# Patient Record
Sex: MALE | Race: WHITE | Employment: FULL TIME | URBAN - METROPOLITAN AREA
[De-identification: names, ages, dates, MRNs, and addresses within clinical notes are randomized per-mention and may not be internally consistent; named-entity substitution may affect disease eponyms.]

---

## 2018-04-20 ENCOUNTER — TELEPHONE (OUTPATIENT)
Dept: ORTHOPEDIC SURGERY | Age: 48
End: 2018-04-20

## 2018-04-20 DIAGNOSIS — M75.42 IMPINGEMENT SYNDROME, SHOULDER, LEFT: Primary | ICD-10-CM

## 2018-04-23 ENCOUNTER — OFFICE VISIT (OUTPATIENT)
Dept: ORTHOPEDIC SURGERY | Age: 48
End: 2018-04-23
Payer: COMMERCIAL

## 2018-04-23 VITALS
DIASTOLIC BLOOD PRESSURE: 83 MMHG | HEIGHT: 75 IN | TEMPERATURE: 98.2 F | WEIGHT: 248 LBS | SYSTOLIC BLOOD PRESSURE: 137 MMHG | BODY MASS INDEX: 30.84 KG/M2 | RESPIRATION RATE: 18 BRPM | HEART RATE: 63 BPM

## 2018-04-23 DIAGNOSIS — M75.42 IMPINGEMENT SYNDROME, SHOULDER, LEFT: Primary | ICD-10-CM

## 2018-04-23 PROCEDURE — 3017F COLORECTAL CA SCREEN DOC REV: CPT | Performed by: ORTHOPAEDIC SURGERY

## 2018-04-23 PROCEDURE — 20610 DRAIN/INJ JOINT/BURSA W/O US: CPT | Performed by: ORTHOPAEDIC SURGERY

## 2018-04-23 PROCEDURE — 99213 OFFICE O/P EST LOW 20 MIN: CPT | Performed by: ORTHOPAEDIC SURGERY

## 2018-04-23 PROCEDURE — 4004F PT TOBACCO SCREEN RCVD TLK: CPT | Performed by: ORTHOPAEDIC SURGERY

## 2018-04-23 PROCEDURE — G8427 DOCREV CUR MEDS BY ELIG CLIN: HCPCS | Performed by: ORTHOPAEDIC SURGERY

## 2018-04-23 PROCEDURE — G8417 CALC BMI ABV UP PARAM F/U: HCPCS | Performed by: ORTHOPAEDIC SURGERY

## 2018-04-23 RX ORDER — TRIAMCINOLONE ACETONIDE 40 MG/ML
80 INJECTION, SUSPENSION INTRA-ARTICULAR; INTRAMUSCULAR ONCE
Status: COMPLETED | OUTPATIENT
Start: 2018-04-23 | End: 2018-04-23

## 2018-04-23 RX ADMIN — TRIAMCINOLONE ACETONIDE 80 MG: 40 INJECTION, SUSPENSION INTRA-ARTICULAR; INTRAMUSCULAR at 11:01

## 2019-02-19 ENCOUNTER — OFFICE VISIT (OUTPATIENT)
Dept: ORTHOPEDIC SURGERY | Age: 49
End: 2019-02-19
Payer: COMMERCIAL

## 2019-02-19 VITALS
RESPIRATION RATE: 18 BRPM | SYSTOLIC BLOOD PRESSURE: 140 MMHG | TEMPERATURE: 98.6 F | DIASTOLIC BLOOD PRESSURE: 80 MMHG | HEART RATE: 65 BPM

## 2019-02-19 DIAGNOSIS — M25.512 BILATERAL SHOULDER PAIN, UNSPECIFIED CHRONICITY: Primary | ICD-10-CM

## 2019-02-19 DIAGNOSIS — M75.42 IMPINGEMENT SYNDROME, SHOULDER, LEFT: ICD-10-CM

## 2019-02-19 DIAGNOSIS — R20.0 NUMBNESS AND TINGLING IN LEFT HAND: ICD-10-CM

## 2019-02-19 DIAGNOSIS — M25.511 BILATERAL SHOULDER PAIN, UNSPECIFIED CHRONICITY: Primary | ICD-10-CM

## 2019-02-19 DIAGNOSIS — R20.2 NUMBNESS AND TINGLING IN LEFT HAND: ICD-10-CM

## 2019-02-19 PROCEDURE — G8484 FLU IMMUNIZE NO ADMIN: HCPCS | Performed by: ORTHOPAEDIC SURGERY

## 2019-02-19 PROCEDURE — G8427 DOCREV CUR MEDS BY ELIG CLIN: HCPCS | Performed by: ORTHOPAEDIC SURGERY

## 2019-02-19 PROCEDURE — 99213 OFFICE O/P EST LOW 20 MIN: CPT | Performed by: ORTHOPAEDIC SURGERY

## 2019-02-19 PROCEDURE — G8417 CALC BMI ABV UP PARAM F/U: HCPCS | Performed by: ORTHOPAEDIC SURGERY

## 2019-02-19 PROCEDURE — 3017F COLORECTAL CA SCREEN DOC REV: CPT | Performed by: ORTHOPAEDIC SURGERY

## 2019-02-19 PROCEDURE — 20610 DRAIN/INJ JOINT/BURSA W/O US: CPT | Performed by: ORTHOPAEDIC SURGERY

## 2019-02-19 PROCEDURE — 4004F PT TOBACCO SCREEN RCVD TLK: CPT | Performed by: ORTHOPAEDIC SURGERY

## 2019-02-19 RX ORDER — TRIAMCINOLONE ACETONIDE 40 MG/ML
80 INJECTION, SUSPENSION INTRA-ARTICULAR; INTRAMUSCULAR ONCE
Status: COMPLETED | OUTPATIENT
Start: 2019-02-19 | End: 2019-02-19

## 2019-02-19 RX ADMIN — TRIAMCINOLONE ACETONIDE 80 MG: 40 INJECTION, SUSPENSION INTRA-ARTICULAR; INTRAMUSCULAR at 16:13

## 2019-03-01 ENCOUNTER — HOSPITAL ENCOUNTER (OUTPATIENT)
Dept: NEUROLOGY | Age: 49
Discharge: HOME | End: 2019-03-01
Payer: COMMERCIAL

## 2019-03-01 DIAGNOSIS — R20.2 NUMBNESS AND TINGLING IN LEFT HAND: ICD-10-CM

## 2019-03-01 DIAGNOSIS — R20.0 NUMBNESS AND TINGLING IN LEFT HAND: ICD-10-CM

## 2019-03-01 DIAGNOSIS — M25.511 BILATERAL SHOULDER PAIN, UNSPECIFIED CHRONICITY: ICD-10-CM

## 2019-03-01 DIAGNOSIS — M25.512 BILATERAL SHOULDER PAIN, UNSPECIFIED CHRONICITY: ICD-10-CM

## 2019-03-01 PROCEDURE — 95886 MUSC TEST DONE W/N TEST COMP: CPT | Performed by: PSYCHIATRY & NEUROLOGY

## 2019-03-01 PROCEDURE — 95912 NRV CNDJ TEST 11-12 STUDIES: CPT | Performed by: PSYCHIATRY & NEUROLOGY

## 2019-03-01 PROCEDURE — 95913 NRV CNDJ TEST 13/> STUDIES: CPT

## 2019-03-01 PROCEDURE — 95886 MUSC TEST DONE W/N TEST COMP: CPT

## 2019-10-06 ENCOUNTER — HOSPITAL ENCOUNTER (EMERGENCY)
Age: 49
Discharge: HOME | End: 2019-10-06
Attending: EMERGENCY MEDICINE
Payer: COMMERCIAL

## 2019-10-06 VITALS
WEIGHT: 258 LBS | OXYGEN SATURATION: 97 % | SYSTOLIC BLOOD PRESSURE: 159 MMHG | DIASTOLIC BLOOD PRESSURE: 80 MMHG | TEMPERATURE: 98.3 F | BODY MASS INDEX: 32.08 KG/M2 | HEART RATE: 98 BPM | RESPIRATION RATE: 14 BRPM | HEIGHT: 75 IN

## 2019-10-06 DIAGNOSIS — R00.2 PALPITATIONS: ICD-10-CM

## 2019-10-06 DIAGNOSIS — F41.1 ANXIETY STATE: Primary | ICD-10-CM

## 2019-10-06 PROCEDURE — 99284 EMERGENCY DEPT VISIT MOD MDM: CPT

## 2019-10-06 PROCEDURE — 93005 ELECTROCARDIOGRAM TRACING: CPT | Performed by: EMERGENCY MEDICINE

## 2019-10-08 LAB
EKG ATRIAL RATE: 88 BPM
EKG P AXIS: 35 DEGREES
EKG P-R INTERVAL: 146 MS
EKG Q-T INTERVAL: 370 MS
EKG QRS DURATION: 104 MS
EKG QTC CALCULATION (BAZETT): 447 MS
EKG R AXIS: 13 DEGREES
EKG T AXIS: 11 DEGREES
EKG VENTRICULAR RATE: 88 BPM

## 2019-10-08 PROCEDURE — 93010 ELECTROCARDIOGRAM REPORT: CPT | Performed by: INTERNAL MEDICINE

## 2020-05-26 ENCOUNTER — TELEPHONE (OUTPATIENT)
Dept: ORTHOPEDIC SURGERY | Age: 50
End: 2020-05-26

## 2020-06-18 ENCOUNTER — OFFICE VISIT (OUTPATIENT)
Dept: ORTHOPEDIC SURGERY | Age: 50
End: 2020-06-18
Payer: COMMERCIAL

## 2020-06-18 VITALS — BODY MASS INDEX: 30.46 KG/M2 | HEIGHT: 75 IN | TEMPERATURE: 97 F | WEIGHT: 245 LBS | RESPIRATION RATE: 18 BRPM

## 2020-06-18 PROCEDURE — 20610 DRAIN/INJ JOINT/BURSA W/O US: CPT | Performed by: ORTHOPAEDIC SURGERY

## 2020-06-18 PROCEDURE — 99213 OFFICE O/P EST LOW 20 MIN: CPT | Performed by: ORTHOPAEDIC SURGERY

## 2020-06-18 PROCEDURE — G8427 DOCREV CUR MEDS BY ELIG CLIN: HCPCS | Performed by: ORTHOPAEDIC SURGERY

## 2020-06-18 PROCEDURE — 4004F PT TOBACCO SCREEN RCVD TLK: CPT | Performed by: ORTHOPAEDIC SURGERY

## 2020-06-18 PROCEDURE — G8417 CALC BMI ABV UP PARAM F/U: HCPCS | Performed by: ORTHOPAEDIC SURGERY

## 2020-06-18 PROCEDURE — 3017F COLORECTAL CA SCREEN DOC REV: CPT | Performed by: ORTHOPAEDIC SURGERY

## 2020-06-18 RX ORDER — TRIAMCINOLONE ACETONIDE 40 MG/ML
80 INJECTION, SUSPENSION INTRA-ARTICULAR; INTRAMUSCULAR ONCE
Status: COMPLETED | OUTPATIENT
Start: 2020-06-18 | End: 2020-06-18

## 2020-06-18 RX ADMIN — TRIAMCINOLONE ACETONIDE 80 MG: 40 INJECTION, SUSPENSION INTRA-ARTICULAR; INTRAMUSCULAR at 10:23

## 2020-06-18 NOTE — PROGRESS NOTES
activity     Days per week: Not on file     Minutes per session: Not on file    Stress: Not on file   Relationships    Social connections     Talks on phone: Not on file     Gets together: Not on file     Attends Anabaptist service: Not on file     Active member of club or organization: Not on file     Attends meetings of clubs or organizations: Not on file     Relationship status: Not on file    Intimate partner violence     Fear of current or ex partner: Not on file     Emotionally abused: Not on file     Physically abused: Not on file     Forced sexual activity: Not on file   Other Topics Concern    Not on file   Social History Narrative    Not on file     Family History   Adopted: Yes       Skin: (-) rash,(-) psoriasis,(-) eczema, (-)skin cancer. Musculoskeletal: (-) fractures,  (-) dislocations,(-) collagen vascular disease, (-) fibromyalgia, (-) multiple sclerosis, (-) muscular dystrophy, (-) RSD,(-) joint pain (-)swelling, (-) joint pain,swelling. Neurologic: (-) epilepsy, (-)seizures,(-) brain tumor,(-) TIA, (-)stroke, (-)headaches, (-)Parkinson disease,(-) memory loss, (-) LOC. Cardiovascular: (-) Chest pain, (-) swelling in legs/feet, (-) SOB, (-) cramping in legs/feet with walking. SUBJECTIVE:      Constitutional:    The patient is alert and oriented x 3, appears to be stated age and in no distress. Right shoulder: Positive tenderness over the long head of the biceps. Positive tenderness over the anterolateral aspect the shoulder. Positive impingement maneuver. Forward elevation 170 degrees. Abduction 100 degrees. Negative drop arm test.  Negative Hornblower's. Nontender over the [facility] joint. Internal rotation of the lower lumbar spine. Negative crossarm test.  Negative belly press test.  Radial, ulnar, median and axillary nerve sensation intact. Motor testing 5/5 in deltoid, biceps, triceps, wrist flexors and digital extensors and flexors and intrinsics. 2+ radial pulse.     Xrays: Trays of the right shoulder AP scapular Y and axillary views were negative for acute fracture dislocations. Subacromial space is maintained. Impression office x-rays: Negative for fracture dislocations right shoulder  Radiographic findings reviewed with patient      Impression: Right shoulder impingement syndrome    Plan: Today's findings were explained. He wished to have a cortisone injection. Continue with range of motion exercises. May follow-up as needed. Procedure Note Cortisone Injection to Shoulder    The right shoulder was identified as the injection site. The risk and benefits of a cortisone injection were explained and the patient consented to the injection. Under sterile conditions, the shoulder SAS was injected with a mixture of 80mg of Kenelog and 4 mL of 5% Ropivacaine and 4 mL of 1% Lidocaine without complication. A sterile bandage was applied.

## 2020-08-10 ENCOUNTER — OFFICE VISIT (OUTPATIENT)
Dept: PHYSICAL MEDICINE AND REHAB | Age: 50
End: 2020-08-10
Payer: COMMERCIAL

## 2020-08-10 VITALS
HEIGHT: 75 IN | SYSTOLIC BLOOD PRESSURE: 132 MMHG | TEMPERATURE: 97.6 F | OXYGEN SATURATION: 98 % | HEART RATE: 76 BPM | DIASTOLIC BLOOD PRESSURE: 78 MMHG | WEIGHT: 244 LBS | BODY MASS INDEX: 30.34 KG/M2

## 2020-08-10 PROCEDURE — 99204 OFFICE O/P NEW MOD 45 MIN: CPT | Performed by: PHYSICAL MEDICINE & REHABILITATION

## 2020-08-10 PROCEDURE — G8417 CALC BMI ABV UP PARAM F/U: HCPCS | Performed by: PHYSICAL MEDICINE & REHABILITATION

## 2020-08-10 PROCEDURE — 4004F PT TOBACCO SCREEN RCVD TLK: CPT | Performed by: PHYSICAL MEDICINE & REHABILITATION

## 2020-08-10 PROCEDURE — G8427 DOCREV CUR MEDS BY ELIG CLIN: HCPCS | Performed by: PHYSICAL MEDICINE & REHABILITATION

## 2020-08-10 PROCEDURE — 3017F COLORECTAL CA SCREEN DOC REV: CPT | Performed by: PHYSICAL MEDICINE & REHABILITATION

## 2020-08-10 NOTE — PROGRESS NOTES
Jen Pastrana M.D. [address] [facility] PHYSICAL MEDICINE AND REHABILITAION  [address]  [place] [address]  Dept: [phone]  Dept Fax: [phone]    PCP: Mary Douglas  Date of visit: 8/10/20    Chief Complaint   Patient presents with    Back Pain     states L-3,4,5 is herniated. last MRI 10 yrs ago. States when back goes out pain radiates down legs has tried PT for back and knees 5-6 yrs ago       Jad Day is a 44 y.o. man with gradual onset of low back pain after no known injury many years ago. 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 Now, he reports constant mild pain, with periodic flare ups every few months. Flare ups typically last a few days. The pain is rated Pain Score:   2, but can reach 10/10 during a flare up. Pain is described as dull aching/burning/stiffness across the low back at or just above belt line level. Typically pain does not radiate, but when he has a flare up he will have radiation to either the right or left leg, to just above the ankle. He denies associated numbness/tingling. He denies weakness. No incointinence reported. The symptoms have been worse since onset. The pain is better with chiropractic care, ibuprofen, lumbar extension. He takes the ibuprofen only for a few days during flare ups. He states he does not like to take medications for pain and does not feel he needs any medication for pain in between flare ups. The pain is worse with lumbar flexion, worse with other activity, but could not pinpoint certain type of activity, \"its a crap shoot\". He reports that he works as an . The prior workup has included:  No recent workup. Last imaging 10 years ago he reports. The prior treatment has included:  PT: Attended PT 5-6 years ago with improvement in pain.  Has not continued home exercise program.   Chiropractic: Sees chiropractor patient is in no apparent distress. Body habitus is well developed. HEENT: No rhinorrhea, sneezing, yawning, or lacrimation. No scleral icterus or conjunctival injection. SKIN: No piloerection. No track marks. No rash. Normal turgor. No erythema or ecchymosis. Psychological: Mood and affect are appropriate. Hygiene is appropriate. Cardiovascular:  Heart is regular rate. There is no edema. Respiratory: Respirations are regular and unlabored. There is no cyanosis. Lymphatic: There is no cervical lymphadenopathy. Gastrointestinal: Soft abdomen, non-tender. Genitourinary: No costovertebral angle tenderness. MSK: Lumbar:  Thoracic kyphosis normal and lumbar lordosis mildly decreased. Pelvis level. No scoliosis. Seated and standing flexion tests negative. There is no step off deformity. No superficial or bony tenderness. Lumbar AROM is full with flexion, extension, and side bending, without pain today. There is poor flexibility of the hamstrings bilaterally. There is mild tenderness over the bilateral lumbar paraspinals and lumbosacral junction. No tenderness to palpation at bilateral SIJ, gluteal muscles, PSIS, ischial tuberosity, greater trochanter, ASIS, iliac crest.  No trigger points. There is mild bilateral lumbar paraspinal spasm. No edema, erythema, ecchymosis,  mass or deformity. Supine straight leg raise is negative bilaterally. TIMOTEO is negative bilaterally. ASIS compression test is negative. Hip: Full painless AROM bilateral hip. Neurologic: Awake, alert and oriented in three planes. Speech is fluent. No facial weakness. Hearing is intact for conversation. Pupils are equal and round. Extraocular muscles are intact. Strength:   R  L  Hip Flex  5  5  Knee Ext  5  5  Ankle dorsi  5  5  EHL   5 5  Ankle Plantar  5  5    Sensory:  Intact for light touch and pin prick in all  lower extremity dermatomes.      Reflexes:   R  L  Patellar  2 2   Ankle Jerk  2 2    No Babinski   No clonus or spasticity. Gait is normal      Impression:   Chronic low back pain with occasional radicular symptoms       Plan:   · Lumbar xrays  · PT for lumbar stabilization, strengthening, stretching/ROM, core strengthening, postural education, pain reduction techniques, education on home exercise/therapy program. Also advised patient on improving hamstring flexibility. · May continue ibuprofen PRN for flare ups as he feels this is effective. Patient does not feel he needs maintenance medication. · Will add topical compound cream that he may use during flare ups  · Discussed other treatment options including medications, modalities, injections that can be considered pending response to above. The patient was educated about the diagnosis, prognosis, indications, risks and benefits of treatment. An opportunity to ask questions was given to the patient and questions were answered. The patient agreed to proceed with the recommended treatment as described above. Follow up 6 weeks     Thank you for the consultation and for allowing me to participate in the care of this patient. Isiah Lai M.D.   Physical Medicine and Rehabilitation

## 2020-08-11 ENCOUNTER — HOSPITAL ENCOUNTER (OUTPATIENT)
Age: 50
Discharge: HOME | End: 2020-08-13
Payer: COMMERCIAL

## 2020-08-11 ENCOUNTER — HOSPITAL ENCOUNTER (OUTPATIENT)
Dept: GENERAL RADIOLOGY | Age: 50
Discharge: HOME | End: 2020-08-13
Payer: COMMERCIAL

## 2020-08-11 PROCEDURE — 72110 X-RAY EXAM L-2 SPINE 4/>VWS: CPT

## 2020-08-19 ENCOUNTER — TELEPHONE (OUTPATIENT)
Dept: PHYSICAL MEDICINE AND REHAB | Age: 50
End: 2020-08-19

## 2020-08-19 NOTE — TELEPHONE ENCOUNTER
Patient called stating he had his X-rays done on 8/11 and was wondering about the results, was wanting someone to call him  Please advise      . Collette Ruts Electronically signed by Som Alvarenga MA on 8/19/2020 at 2:54 PM

## 2020-10-12 ENCOUNTER — OFFICE VISIT (OUTPATIENT)
Dept: PHYSICAL MEDICINE AND REHAB | Age: 50
End: 2020-10-12
Payer: COMMERCIAL

## 2020-10-12 VITALS
DIASTOLIC BLOOD PRESSURE: 84 MMHG | SYSTOLIC BLOOD PRESSURE: 122 MMHG | TEMPERATURE: 96.9 F | WEIGHT: 245 LBS | HEIGHT: 75 IN | BODY MASS INDEX: 30.46 KG/M2

## 2020-10-12 PROCEDURE — G8427 DOCREV CUR MEDS BY ELIG CLIN: HCPCS | Performed by: PHYSICAL MEDICINE & REHABILITATION

## 2020-10-12 PROCEDURE — 4004F PT TOBACCO SCREEN RCVD TLK: CPT | Performed by: PHYSICAL MEDICINE & REHABILITATION

## 2020-10-12 PROCEDURE — 3017F COLORECTAL CA SCREEN DOC REV: CPT | Performed by: PHYSICAL MEDICINE & REHABILITATION

## 2020-10-12 PROCEDURE — 99214 OFFICE O/P EST MOD 30 MIN: CPT | Performed by: PHYSICAL MEDICINE & REHABILITATION

## 2020-10-12 PROCEDURE — G8484 FLU IMMUNIZE NO ADMIN: HCPCS | Performed by: PHYSICAL MEDICINE & REHABILITATION

## 2020-10-12 PROCEDURE — G8417 CALC BMI ABV UP PARAM F/U: HCPCS | Performed by: PHYSICAL MEDICINE & REHABILITATION

## 2020-10-12 NOTE — PROGRESS NOTES
Lilibeth Middleton M.D. [address] [facility] PHYSICAL MEDICINE AND REHABILITAION  [address]  [place] [address]  Dept: [phone]  Dept Fax: [phone]    PCP: Jose Denton  Date of visit: 10/12/20    Chief Complaint   Patient presents with    Back Pain     f/u for back pain, went to PT for 2-3 sessions now doing exercises at home. states compound cream helped at first now hes not sure how much it is helping after they took B12 out       Cesar Madrigal is a 44 y.o. man who presents for follow up of low back pain. He reported gradual onset of low back pain after no known injury many years ago. 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 Since last visit he started PT and reports that he is now doing the exercises on his own at home with benefit. He reported improvement with compound cream initially, but states he asked them to take the B12 out because it was staining his clothes and now he is not sure if it is helping after they changed the formula. He completed lumbar xray with result as below. Now, he reports constant mild pain, with periodic flare ups every few months. Flare ups typically last a few days. The pain is rated Pain Score:   2 . Pain is described as dull aching/stiffness across the low back at or just above belt line level. Typically pain does not radiate, but when he has a flare up he may have radiation to either the right or left leg, to just above the ankle. He denies associated numbness/tingling. He denies weakness. No incointinence reported. The symptoms are somewhat improved since last visit. The pain is better with chiropractic care, ibuprofen, lumbar extension, home exercises. He takes the ibuprofen only for a few days during flare ups. He states he does not like to take medications for pain and does not feel he needs any medication for pain in between flare ups.  The pain is worse with lumbar flexion, worse with other activity. He reports that he works as an . The prior workup has included:  Lumbar xay 8/11/2020  Findings:    Alignment of the vertebral bodies appears to be near-anatomic    No fracture or foreign body is identified. The disc spaces demonstrate multilevel moderate degenerative    narrowing. There are multilevel endplate spurs present. There is no significant loss of the vertebral body height    There are mild to moderate degenerative changes involving the facets. Flexion-extension views are not included.              Impression         Findings consistent with moderate multilevel degenerative disc disease    and degenerative facets disease.               The prior treatment has included:  PT: Attended PT summer 2020 with benefit. Continues home exercise program.   Chiropractic: Sees chiropractor periodically with improvement in pain. Uses decompression table at chiropractor with improvement. Modalities: Uses inversion table at home with temporary relief. Uses TENs with improvement, has home TENs. Ice and heat both provide minimal relief. Using lumbar support brace when he is doing heavier activity with benefit. Topical: Lidoderm without relief. CBD oil without relief. Compound cream initially with relief, but not helping as much after they took B12 out  OTC Tylenol: None  NSAIDS: ibuprofen with relief, takes only during a flare up  Opioids: None  Membrane stabilizers: None  Muscle relaxers: None  Previous injections: None  Previous surgery at this site: None        No past medical history on file. Past Surgical History:   Procedure Laterality Date    KNEE ARTHROSCOPY Right 2013    KNEE ARTHROSCOPY Left 09/04/2015    Left knee scope. Lana Finch.  Clint Pike MD       Social History     Socioeconomic History    Marital status:      Spouse name: Not on file    Number of children: Not on file    Years of education: Not on file    Highest education level: Not on file   Occupational History    Not on file   Social Needs    Financial resource strain: Not on file    Food insecurity     Worry: Not on file     Inability: Not on file    Transportation needs     Medical: Not on file     Non-medical: Not on file   Tobacco Use    Smoking status: Never Smoker    Smokeless tobacco: Current User     Types: Snuff   Substance and Sexual Activity    Alcohol use: Yes     Alcohol/week: 2.0 - 3.0 standard drinks     Types: 2 - 3 Glasses of wine per week    Drug use: No    Sexual activity: Yes   Lifestyle    Physical activity     Days per week: Not on file     Minutes per session: Not on file    Stress: Not on file   Relationships    Social connections     Talks on phone: Not on file     Gets together: Not on file     Attends Episcopalian service: Not on file     Active member of club or organization: Not on file     Attends meetings of clubs or organizations: Not on file     Relationship status: Not on file    Intimate partner violence     Fear of current or ex partner: Not on file     Emotionally abused: Not on file     Physically abused: Not on file     Forced sexual activity: Not on file   Other Topics Concern    Not on file   Social History Narrative    Not on file       Family History   Adopted: Yes       Allergies   Allergen Reactions    Bee Venom Anaphylaxis       Current Outpatient Medications   Medication Sig Dispense Refill    COLLAGEN PO Take 1,600 mg by mouth      Multiple Vitamins-Minerals (MULTI COMPLETE PO) Take 1 tablet by mouth daily      melatonin 3 MG TABS tablet Take 3 mg by mouth nightly      ibuprofen (ADVIL;MOTRIN) 200 MG tablet Take 200 mg by mouth every 6 hours as needed for Pain       No current facility-administered medications for this visit. Review of Systems  General: No reported chills, fatigue, fever, malaise, night sweats, weight gain,  weight loss.    Psychological: No reported anxiety, depression, suicidal ideation   Ophthalmic: No reported blurry vision, decreased vision, double vision, loss of vision  Ear Nose Throat: No reported hearing loss, tinnitus, phonophobia, sensitivity to smells, vertigo, or vocal changes. Allergy/Immunology: No reported watery eyes, itchy eyes, frequent infections. Hematological and Lymphatic: No reported bleeding problems, blood clots, bruising  Endocrine:  No reported polydypsia, polyuria, temperature intolerance. Respiratory: No reported cough, shortness of breath, wheezing. Cardiovascular: No reported syncope, chest pain, dyspnea on exertion,palpitations. Gastrointestinal: No reported abdominal pain, hematemesis, melena, nausea, vomiting, stool incontinence  Genito-Urinary: No reported dysuria, hematuria, incontinence   Musculoskeletal: Per HPI  Neurological: Per HPI  Dermatological:  No reported rash      Physical Exam:   Blood pressure 122/84, temperature 96.9 °F (36.1 °C), height 6' 3\" (1.905 m), weight 245 lb (111.1 kg). General: The patient is in no apparent distress. Body habitus is well developed. HEENT: No rhinorrhea, sneezing, yawning, or lacrimation. No scleral icterus or conjunctival injection. SKIN: No piloerection. No track marks. No rash. Normal turgor. No erythema or ecchymosis. Psychological: Mood and affect are appropriate. Hygiene is appropriate. Cardiovascular:  Heart is regular rate. There is no edema. Respiratory: Respirations are regular and unlabored. There is no cyanosis. Lymphatic: There is no cervical lymphadenopathy. Gastrointestinal: Soft abdomen, non-tender. Genitourinary: No costovertebral angle tenderness. MSK: Lumbar:  Thoracic kyphosis normal and lumbar lordosis mildly decreased. Pelvis level. No scoliosis. Seated and standing flexion tests negative. There is no step off deformity. No superficial or bony tenderness. Lumbar AROM is full with flexion, extension, and side bending, without pain today.  There is poor flexibility of the hamstrings bilaterally--though improved compared to his last visit. There is mild tenderness over the bilateral lumbar paraspinals and lumbosacral junction. No tenderness to palpation at bilateral SIJ, gluteal muscles, PSIS, ischial tuberosity, greater trochanter, ASIS, iliac crest.  No trigger points. There is mild bilateral lumbar paraspinal spasm. No edema, erythema, ecchymosis,  mass or deformity. Straight leg raise is negative bilaterally. TIMOTEO is negative bilaterally. ASIS compression test is negative. Hip: Full painless AROM bilateral hip. Neurologic: Awake, alert and oriented in three planes. Speech is fluent. No facial weakness. Hearing is intact for conversation. Pupils are equal and round. Extraocular muscles are intact. Strength:   R  L  Hip Flex  5  5  Knee Ext  5  5  Ankle dorsi  5  5  EHL   5 5  Ankle Plantar  5  5    Sensory:  Intact for LT and PP in all  lower extremity dermatomes. Reflexes:   R  L  Patellar  2 2   Ankle Jerk  2 2    No Babinski   No clonus or spasticity. Gait is normal      Impression:   Lumbar DDD  Lumbar facet arthropathy      Plan:   · Continue home exercise program for maintenance. Reviewed hamstring stretches as well. · Will trial topical voltaren gel  · May continue ibuprofen PRN for flare ups as he feels this is effective and he is using rarely. Patient does not feel he needs maintenance medication. · May utilize lumbar support brace during heavier activity  · Discussed other treatment options including medications, modalities, injections that can be considered pending response to above. He feels that he is managing reasonably well at this time. The patient was educated about the diagnosis, prognosis, indications, risks and benefits of treatment. An opportunity to ask questions was given to the patient and questions were answered. The patient agreed to proceed with the recommended treatment as described above.    Follow up 3 months. May cancel and follow up PRN if he feels he is doing well and pain continues to be reasonably well controlled. Alexander Curling, M.D.   Physical Medicine and Rehabilitation

## 2022-05-27 ENCOUNTER — TELEPHONE (OUTPATIENT)
Dept: ORTHOPEDIC SURGERY | Age: 52
End: 2022-05-27

## 2022-05-27 NOTE — TELEPHONE ENCOUNTER
5/27/2022 2:55 pm Patient phoned the office to request an appointment next Tuesday w/ [facility]. New injury to left knee, stepped off a curb last Thursday, May 19 th. Saw PCP, X-rays done at [facility]. PCP feels it is a torn meniscus. Patient is requesting steroid pills. Patient reports if he cannot see [facility] on Tuesday, he has an appointment w/ the PA for a steroid injection. Informed patient: Unfortunately no appointments available next week. Patient requested an appointment for the 22 cd or 23 rd of June d/t he is RTW. Appointment made for Wednesday, June 22 cd at 9:30 am. Patient instructed to arrive 15 minutes before scheduled appointment, bring ID and insurance cards, bring copy of x-rays (disc) from [facility]. Patient expresses understanding and is in agreement w/ the plan.

## 2022-06-21 ENCOUNTER — OFFICE VISIT (OUTPATIENT)
Dept: ORTHOPEDIC SURGERY | Age: 52
End: 2022-06-21
Payer: COMMERCIAL

## 2022-06-21 VITALS — WEIGHT: 248 LBS | BODY MASS INDEX: 30.84 KG/M2 | HEIGHT: 75 IN

## 2022-06-21 DIAGNOSIS — T14.90XA INJURY: ICD-10-CM

## 2022-06-21 DIAGNOSIS — M17.12 PRIMARY OSTEOARTHRITIS OF LEFT KNEE: Primary | ICD-10-CM

## 2022-06-21 PROCEDURE — 20610 DRAIN/INJ JOINT/BURSA W/O US: CPT | Performed by: ORTHOPAEDIC SURGERY

## 2022-06-21 PROCEDURE — G8427 DOCREV CUR MEDS BY ELIG CLIN: HCPCS | Performed by: ORTHOPAEDIC SURGERY

## 2022-06-21 PROCEDURE — 99213 OFFICE O/P EST LOW 20 MIN: CPT | Performed by: ORTHOPAEDIC SURGERY

## 2022-06-21 PROCEDURE — G8417 CALC BMI ABV UP PARAM F/U: HCPCS | Performed by: ORTHOPAEDIC SURGERY

## 2022-06-21 PROCEDURE — 3017F COLORECTAL CA SCREEN DOC REV: CPT | Performed by: ORTHOPAEDIC SURGERY

## 2022-06-21 PROCEDURE — 4004F PT TOBACCO SCREEN RCVD TLK: CPT | Performed by: ORTHOPAEDIC SURGERY

## 2022-06-21 RX ORDER — KETOCONAZOLE 20 MG/ML
SHAMPOO TOPICAL
COMMUNITY

## 2022-06-21 RX ORDER — BUPIVACAINE HYDROCHLORIDE 2.5 MG/ML
3 INJECTION, SOLUTION INFILTRATION; PERINEURAL ONCE
Status: COMPLETED | OUTPATIENT
Start: 2022-06-21 | End: 2022-06-21

## 2022-06-21 RX ORDER — TRIAMCINOLONE ACETONIDE 40 MG/ML
80 INJECTION, SUSPENSION INTRA-ARTICULAR; INTRAMUSCULAR ONCE
Status: COMPLETED | OUTPATIENT
Start: 2022-06-21 | End: 2022-06-21

## 2022-06-21 RX ORDER — DICLOFENAC SODIUM 20 MG/G
SOLUTION TOPICAL 2 TIMES DAILY PRN
Status: ON HOLD | COMMUNITY
End: 2022-08-08 | Stop reason: HOSPADM

## 2022-06-21 RX ADMIN — BUPIVACAINE HYDROCHLORIDE 7.5 MG: 2.5 INJECTION, SOLUTION INFILTRATION; PERINEURAL at 12:01

## 2022-06-21 RX ADMIN — TRIAMCINOLONE ACETONIDE 80 MG: 40 INJECTION, SUSPENSION INTRA-ARTICULAR; INTRAMUSCULAR at 12:02

## 2022-06-21 NOTE — PROGRESS NOTES
Chief Complaint:   Chief Complaint   Patient presents with    Knee Injury     Left knee injury 5/19/2022. Was in [place], stepped off curb, over-extended left knee. Knee has been locking up. Dropped off x-ray disc last week. Kirstin Velasco presents with recent left knee pain after a nonspecific ground-level step and twist a few weeks ago. He is having mechanical symptoms of subjective locking or giving way with transfer activities. History is notable for arthroscopy of both knees in the remote past at which time significant patellofemoral arthritis was seen on the left. He is taking some over-the-counter's on occasion with partial relief. Right knee at this time relatively symptomatic no other joint complaints. Allergies; medications; past medical, surgical, family, and social history; and problem list have been reviewed today and updated as indicated in this encounter seen below. Exam: Leg lengths equal hip motion painless, both knees normally aligned straight and stable to stress through full range of motion with moderate patellar crepitus left more than right knee. No effusion or erythema. Radiographs: Weightbearing AP x-ray of the knees today do show some joint space narrowing and irregularities not end-stage, previous x-rays done at [facility] recently were reviewed by me today and on the lateral view there is complete loss of retropatellar joint space with patellar sclerosis consistent with end-stage patellofemoral arthritis left knee. Gabriela Eubanks was seen today for knee injury.     Diagnoses and all orders for this visit:    Primary osteoarthritis of left knee  -     NM ARTHROCENTESIS ASPIR&/INJ MAJOR JT/BURSA W/O US    Injury  -     XR KNEE BILATERAL STANDING  -     Cancel: XR KNEE LEFT (1-2 VIEWS)    Other orders  -     triamcinolone acetonide (KENALOG-40) injection 80 mg  -     bupivacaine (MARCAINE) 0.25 % injection 7.5 mg       Diagnosis and treatment options were reviewed with the patient, he is thinking about another arthroscopic \"cleanout\" although at this point I think his disease has advanced to the point where that may not likely benefit him and I advised him of that today. We discussed other alternatives including injection which she requested today, I injected left knee with Kenalog and Marcaine no difficulty or complication tolerated well. Low-impact exercise also advised and follow-up in 6 weeks if pain persist for further discussion about arthroscopy with tempered expectation versus total knee arthroplasty. Return in about 6 weeks (around 8/2/2022). Current Outpatient Medications   Medication Sig Dispense Refill    ketoconazole (NIZORAL) 2 % shampoo ketoconazole 2 % shampoo   APPLY TO THE AFFECTED AREA DAILY/AS NEEDED. ADVISED TO LET SIT 15 MINUTES PRIOR TO WASHING OUT.      Diclofenac Sodium (PENNSAID) 2 % SOLN Apply topically 2 times daily as needed      Multiple Vitamins-Minerals (MULTI COMPLETE PO) Take 1 tablet by mouth daily      melatonin 3 MG TABS tablet Take 3 mg by mouth nightly      ibuprofen (ADVIL;MOTRIN) 200 MG tablet Take 200 mg by mouth every 6 hours as needed for Pain      diclofenac sodium (VOLTAREN) 1 % GEL Apply 4 g topically 4 times daily as needed (pain) 480 g 4    COLLAGEN PO Take 1,600 mg by mouth (Patient not taking: Reported on 6/21/2022)       No current facility-administered medications for this visit. Patient Active Problem List   Diagnosis    Bilateral shoulder pain       History reviewed. No pertinent past medical history. Past Surgical History:   Procedure Laterality Date    KNEE ARTHROSCOPY Right 2013    KNEE ARTHROSCOPY Left 09/04/2015    Left knee scope. Ashley Reynoso.  Francis Alaniz MD       Allergies   Allergen Reactions    Bee Venom Anaphylaxis       Social History     Socioeconomic History    Marital status:      Spouse name: None    Number of children: None    Years of education: None    Highest education level: None   Occupational History    None   Tobacco Use    Smoking status: Never Smoker    Smokeless tobacco: Current User     Types: Snuff   Substance and Sexual Activity    Alcohol use: Yes     Alcohol/week: 2.0 - 3.0 standard drinks     Types: 2 - 3 Glasses of wine per week    Drug use: No    Sexual activity: Yes   Other Topics Concern    None   Social History Narrative    None     Social Determinants of Health     Financial Resource Strain:     Difficulty of Paying Living Expenses: Not on file   Food Insecurity:     Worried About Running Out of Food in the Last Year: Not on file    Marya of Food in the Last Year: Not on file   Transportation Needs:     Lack of Transportation (Medical): Not on file    Lack of Transportation (Non-Medical): Not on file   Physical Activity:     Days of Exercise per Week: Not on file    Minutes of Exercise per Session: Not on file   Stress:     Feeling of Stress : Not on file   Social Connections:     Frequency of Communication with Friends and Family: Not on file    Frequency of Social Gatherings with Friends and Family: Not on file    Attends Hindu Services: Not on file    Active Member of [address] [place] [address] or Organizations: Not on file    Attends Club or Organization Meetings: Not on file    Marital Status: Not on file   Intimate Partner Violence:     Fear of Current or Ex-Partner: Not on file    Emotionally Abused: Not on file    Physically Abused: Not on file    Sexually Abused: Not on file   Housing Stability:     Unable to Pay for Housing in the Last Year: Not on file    Number of [place] in the Last Year: Not on file    Unstable Housing in the Last Year: Not on file       Family History   Adopted: Yes         Review of Systems  As follows except as previously noted in HPI:  Constitutional: Negative for chills, diaphoresis, fatigue, fever and unexpected weight change. Respiratory: Negative for cough, shortness of breath and wheezing.     Cardiovascular: Negative for chest pain and palpitations. Neurological: Negative for dizziness, syncope, cephalgia. GI / : negative  Musculoskeletal: see HPI       Objective:   Physical Exam   Constitutional: Oriented to person, place, and time. and appears well-developed and well-nourished. :   Head: Normocephalic and atraumatic. Eyes: EOM are normal.   Neck: Neck supple. Cardiovascular: Normal rate and regular rhythm. Pulmonary/Chest: Effort normal. No stridor. No respiratory distress, no wheezes. Abdominal:  No abnormal distension. Neurological: Alert and oriented to person, place, and time. Skin: Skin is warm and dry. Psychiatric: Normal mood and affect.  Behavior is normal. Thought content normal.    6/21/2022  12:05 PM

## 2022-07-11 ENCOUNTER — TELEPHONE (OUTPATIENT)
Dept: ORTHOPEDIC SURGERY | Age: 52
End: 2022-07-11

## 2022-07-11 NOTE — TELEPHONE ENCOUNTER
Patient phoned the office today stating he would like to proceed with left knee arthroscopic surgery. Steroid injection received last office visit  6/21/22 only helped for approximately a week. Knee is tender to touch, stiff in the morning, and  has a knot behind knee. If available he is hoping to get on your surgery schedule August 8th or 15th. Please advise.

## 2022-07-13 NOTE — TELEPHONE ENCOUNTER
7/12/2022 Follow up phone call / General Abide w/ and informed patient: Will schedule surgery on 8/08 as requested. Patient given PAT's phone # [phone] (reason: patient may still be in [place] when nurse calls w/ pre op instructions)    Surgical Procedure: LEFT KNEE ARTHROSCOPY, Anesthesia: [facility], Date: Monday, August 8, 2022, Time: TBD, Place: [facility], Surgeon: Colt Garcia. Medications reviewed with the patient / holding the following medications: Advil 3 days pre op. Pre Op Instructions reviewed with the patient. Informed patient: A hospital nurse will contact him with instructions for the day of surgery. The patient expresses understanding and is in agreement with the plan.       Post Op Appointment: Tuesday, August 16 th at 9:30 am    7/13/2022 Letter mailed to the patient which includes:  - Brochure from [address] [place] [address] Instructions  For Knee Arthroscopy  - Appointment card

## 2022-08-02 ENCOUNTER — TELEPHONE (OUTPATIENT)
Dept: ORTHOPEDIC SURGERY | Age: 52
End: 2022-08-02

## 2022-08-02 NOTE — TELEPHONE ENCOUNTER
Spoke with Guerda Mcgregor @ [facility] Pre-Cert. Clinical were sent on 7/22/2022 for review. Prior Authorization    Procedure:  Left Knee Arthroscopy   Procedure Code: 16432  Diagnosis Code:  OA left knee, Lt knee injury  Date:  8/8/2022  Facility:  [address] [place] [address]  Status: OPT  Physician:  Chery Do M.D. Auth Number: [id number]  Valid:  8/8/2022 - 11/6/2022  Contact: Seema LOBO

## 2022-08-03 NOTE — PROGRESS NOTES
[facility] PRE-ADMISSION TESTING INSTRUCTIONS    The Preadmission Testing patient is instructed accordingly using the following criteria (check applicable):    ARRIVAL INSTRUCTIONS:  [x] Parking the day of Surgery is located in the Main Entrance lot. Upon entering the door, make an immediate right to the surgery reception desk    [x] Bring photo ID and insurance card    [x] Bring in a copy of Living will or Durable Power of  papers. [x] Please be sure to arrange for responsible adult to provide transportation to and from the hospital    [x] Please arrange for responsible adult to be with you for the 24 hour period post procedure due to having anesthesia    [x] If you awake am of surgery not feeling well or have temperature >100 please call [phone]    GENERAL INSTRUCTIONS:    [x] Nothing by mouth after midnight, including gum, candy, mints or water    [x] You may brush your teeth, but do not swallow any water    [] Take medications as instructed with 1-2 oz of water    [x] Stop herbal supplements and vitamins 5 days prior to procedure    [] Follow preop dosing of blood thinners per physician instructions    [] Take 1/2 dose of evening insulin, but no insulin after midnight    [] No oral diabetic medications after midnight    [] If diabetic and have low blood sugar or feel symptomatic, take 1-2oz apple juice only    [] Bring inhalers day of surgery    [] Bring C-PAP/ Bi-Pap day of surgery    [] Bring urine specimen day of surgery    [x] Shower or bath with soap, lather and rinse well, AM of Surgery, no lotion, powders or creams     [] Follow bowel prep as instructed per surgeon    [x] No tobacco products within 24 hours of surgery     [x] No alcohol or illegal drug use within 24 hours of surgery.     [x] Jewelry, body piercing's, eyeglasses, contact lenses and dentures are not permitted into surgery (bring cases)      [] Please do not wear any nail polish, make up or hair products on the day of surgery    [x] You can expect a call the business day prior to procedure to notify you if your arrival time changes    [x] If you receive a survey after surgery we would greatly appreciate your comments    [] Parent/guardian of a minor must accompany their child and remain on the premises  the entire time they are under our care     [] Pediatric patients may bring favorite toy, blanket or comfort item with them    [] A caregiver or family member must remain with the patient during their stay if they are mentally handicapped, have dementia, disoriented or unable to use a call light or would be a safety concern if left unattended    [x] Please notify surgeon if you develop any illness between now and time of surgery (cold, cough, sore throat, fever, nausea, vomiting) or any signs of infections  including skin, wounds, and dental.    [x]  The Outpatient Pharmacy is available to fill your prescription here on your day of surgery, ask your preop nurse for details    [x] Other instructions: wear loose, comfortable clothing    EDUCATIONAL MATERIALS PROVIDED:    [] PAT Preoperative Education Packet/Booklet     [] Medication List    [] Transfusion bracelet applied with instructions    [] Shower with soap, lather and rinse well, and use CHG wipes provided the evening before surgery as instructed    [] Incentive spirometer with instructions

## 2022-08-04 ENCOUNTER — PREP FOR PROCEDURE (OUTPATIENT)
Dept: ORTHOPEDIC SURGERY | Age: 52
End: 2022-08-04

## 2022-08-04 RX ORDER — SODIUM CHLORIDE 0.9 % (FLUSH) 0.9 %
5-40 SYRINGE (ML) INJECTION EVERY 12 HOURS SCHEDULED
Status: CANCELLED | OUTPATIENT
Start: 2022-08-04

## 2022-08-04 RX ORDER — SODIUM CHLORIDE 9 MG/ML
INJECTION, SOLUTION INTRAVENOUS PRN
Status: CANCELLED | OUTPATIENT
Start: 2022-08-04

## 2022-08-04 RX ORDER — SODIUM CHLORIDE 0.9 % (FLUSH) 0.9 %
5-40 SYRINGE (ML) INJECTION PRN
Status: CANCELLED | OUTPATIENT
Start: 2022-08-04

## 2022-08-04 NOTE — H&P (VIEW-ONLY)
Department of Orthopedic Surgery  Attending History and Physical        CHIEF COMPLAINT: Left knee pain    Reason for Admission: Arthroscopy left knee    History Obtained From: patient, electronic medical record    HISTORY OF PRESENT ILLNESS:      The patient is a 45 y.o. male with significant past medical history of low back pain and previous knee arthroscopic surgeries who presents with left knee pain after a nonspecific ground-level step and twist a few weeks ago. He is having mechanical symptoms of subjective locking or giving way with transfer activities. History is notable for arthroscopy of both knees in the remote past at which time significant patellofemoral arthritis was seen on the left. He is taking some over-the-counter's on occasion with partial relief. Right knee at this time relatively symptomatic no other joint complaints. Left knee pain has not responded to relative rest, oral medication, although steroid injection did provide significant but very short-term relief. Past Medical History:        Diagnosis Date    Low back pain     herniated disc     Past Surgical History:        Procedure Laterality Date    KNEE ARTHROSCOPY Right 2013    KNEE ARTHROSCOPY Left 09/04/2015    Left knee scope. Cynthia Garcia MD    NASAL FRACTURE SURGERY      as child     Immunizations:              Influenza:  Not indicated            Pneumococcal Polysaccharide:  Not indicated    Medications Prior to Admission:     Current Outpatient Medications:     ketoconazole (NIZORAL) 2 % shampoo, ketoconazole 2 % shampoo  APPLY TO THE AFFECTED AREA DAILY/AS NEEDED.  ADVISED TO LET SIT 15 MINUTES PRIOR TO WASHING OUT., Disp: , Rfl:     Diclofenac Sodium 2 % SOLN, Apply topically 2 times daily as needed, Disp: , Rfl:     diclofenac sodium (VOLTAREN) 1 % GEL, Apply 4 g topically 4 times daily as needed (pain), Disp: 480 g, Rfl: 4    melatonin 3 MG TABS tablet, Take 3 mg by mouth nightly as needed, Disp: , Rfl: ibuprofen (ADVIL;MOTRIN) 200 MG tablet, Take 200 mg by mouth every 6 hours as needed for Pain, Disp: , Rfl:       Allergies:  Bee venom    Social History:   TOBACCO:   reports that he has quit smoking. His smoking use included cigarettes. His smokeless tobacco use includes snuff. ETOH:   reports current alcohol use of about 2.0 - 3.0 standard drinks per week. Patient currently lives in a private home  Family History:       Adopted: Yes     REVIEW OF SYSTEMS:  Constitutional: Negative for chills, diaphoresis, fatigue, fever and unexpected weight change. Respiratory: Negative for cough, shortness of breath and wheezing. Cardiovascular: Negative for chest pain and palpitations. Neurological: Negative for dizziness, syncope, weakness and numbness. Musculoskeletal: see HPI     PHYSICAL EXAM:  VITALS:  There were no vitals taken for this visit. CONSTITUTIONAL: Overweight otherwise healthy-appearing middle-age male  NECK:  supple, symmetrical, trachea midline  LUNGS:  clear  CARDIOVASCULAR:  Regular rate and rhythm  MUSCULOSKELETAL:   Leg lengths equal hip motion painless, both knees normally aligned straight and stable to stress through full range of motion with moderate patellar crepitus left more than right knee. No effusion or erythema. Generalized tenderness to palpation but negative Krysta's.   NEUROLOGIC:  Grossly intact motor and sensation  Mental Status Exam:  Level of Alertness:   awake  Orientation:   person, place, time    DATA:  CBC with Differential:  No results found for: WBC, RBC, HGB, HCT, PLT, MCV, MCH, MCHC, RDW, NRBC, SEGSPCT, BANDSPCT, BLASTSPCT, METASPCT, LYMPHOPCT, PROMYELOPCT, MONOPCT, MYELOPCT, EOSPCT, BASOPCT, MONOSABS, LYMPHSABS, EOSABS, BASOSABS, DIFFTYPE    Radiographs:  Weightbearing AP x-ray of the knees do show some joint space narrowing and irregularities not end-stage, previous x-rays done at [facility] recently were reviewed by me today and on the lateral view there is complete loss of retropatellar joint space with patellar sclerosis consistent with end-stage patellofemoral arthritis left knee. ASSESSMENT AND PLAN:    Chronic left knee pain with patellofemoral arthritis. Diagnosis and treatment alternatives were reviewed, patient is having disabling pain that has not responded to reasonable nonsurgical means. We talked about the alternative of arthroscopic intervention with potentially tempered expectations including procedure itself likely risk benefits probable outcome, and the fact the patient may progress ultimately to total knee arthroplasty. At this time the patient request to proceed with arthroscopic surgery on the left knee which will be performed on an outpatient basis under [facility].

## 2022-08-04 NOTE — H&P
Department of Orthopedic Surgery  Attending History and Physical        CHIEF COMPLAINT: Left knee pain    Reason for Admission: Arthroscopy left knee    History Obtained From: patient, electronic medical record    HISTORY OF PRESENT ILLNESS:      The patient is a 45 y.o. male with significant past medical history of low back pain and previous knee arthroscopic surgeries who presents with left knee pain after a nonspecific ground-level step and twist a few weeks ago. He is having mechanical symptoms of subjective locking or giving way with transfer activities. History is notable for arthroscopy of both knees in the remote past at which time significant patellofemoral arthritis was seen on the left. He is taking some over-the-counter's on occasion with partial relief. Right knee at this time relatively symptomatic no other joint complaints. Left knee pain has not responded to relative rest, oral medication, although steroid injection did provide significant but very short-term relief. Past Medical History:        Diagnosis Date    Low back pain     herniated disc     Past Surgical History:        Procedure Laterality Date    KNEE ARTHROSCOPY Right 2013    KNEE ARTHROSCOPY Left 09/04/2015    Left knee scope. Memo Corona. MD Jose    NASAL FRACTURE SURGERY      as child     Immunizations:              Influenza:  Not indicated            Pneumococcal Polysaccharide:  Not indicated    Medications Prior to Admission:     Current Outpatient Medications:     ketoconazole (NIZORAL) 2 % shampoo, ketoconazole 2 % shampoo  APPLY TO THE AFFECTED AREA DAILY/AS NEEDED.  ADVISED TO LET SIT 15 MINUTES PRIOR TO WASHING OUT., Disp: , Rfl:     Diclofenac Sodium 2 % SOLN, Apply topically 2 times daily as needed, Disp: , Rfl:     diclofenac sodium (VOLTAREN) 1 % GEL, Apply 4 g topically 4 times daily as needed (pain), Disp: 480 g, Rfl: 4    melatonin 3 MG TABS tablet, Take 3 mg by mouth nightly as needed, Disp: , Rfl: ibuprofen (ADVIL;MOTRIN) 200 MG tablet, Take 200 mg by mouth every 6 hours as needed for Pain, Disp: , Rfl:       Allergies:  Bee venom    Social History:   TOBACCO:   reports that he has quit smoking. His smoking use included cigarettes. His smokeless tobacco use includes snuff. ETOH:   reports current alcohol use of about 2.0 - 3.0 standard drinks per week. Patient currently lives in a private home  Family History:       Adopted: Yes     REVIEW OF SYSTEMS:  Constitutional: Negative for chills, diaphoresis, fatigue, fever and unexpected weight change. Respiratory: Negative for cough, shortness of breath and wheezing. Cardiovascular: Negative for chest pain and palpitations. Neurological: Negative for dizziness, syncope, weakness and numbness. Musculoskeletal: see HPI     PHYSICAL EXAM:  VITALS:  There were no vitals taken for this visit. CONSTITUTIONAL: Overweight otherwise healthy-appearing middle-age male  NECK:  supple, symmetrical, trachea midline  LUNGS:  clear  CARDIOVASCULAR:  Regular rate and rhythm  MUSCULOSKELETAL:   Leg lengths equal hip motion painless, both knees normally aligned straight and stable to stress through full range of motion with moderate patellar crepitus left more than right knee. No effusion or erythema. Generalized tenderness to palpation but negative Krysta's.   NEUROLOGIC:  Grossly intact motor and sensation  Mental Status Exam:  Level of Alertness:   awake  Orientation:   person, place, time    DATA:  CBC with Differential:  No results found for: WBC, RBC, HGB, HCT, PLT, MCV, MCH, MCHC, RDW, NRBC, SEGSPCT, BANDSPCT, BLASTSPCT, METASPCT, LYMPHOPCT, PROMYELOPCT, MONOPCT, MYELOPCT, EOSPCT, BASOPCT, MONOSABS, LYMPHSABS, EOSABS, BASOSABS, DIFFTYPE    Radiographs:  Weightbearing AP x-ray of the knees do show some joint space narrowing and irregularities not end-stage, previous x-rays done at [facility] recently were reviewed by me today and on the lateral view there is

## 2022-08-07 ENCOUNTER — ANESTHESIA EVENT (OUTPATIENT)
Dept: OPERATING ROOM | Age: 52
End: 2022-08-07
Payer: COMMERCIAL

## 2022-08-08 ENCOUNTER — ANESTHESIA (OUTPATIENT)
Dept: OPERATING ROOM | Age: 52
End: 2022-08-08
Payer: COMMERCIAL

## 2022-08-08 ENCOUNTER — HOSPITAL ENCOUNTER (OUTPATIENT)
Age: 52
Setting detail: OUTPATIENT SURGERY
Discharge: HOME | End: 2022-08-08
Attending: ORTHOPAEDIC SURGERY | Admitting: ORTHOPAEDIC SURGERY
Payer: COMMERCIAL

## 2022-08-08 VITALS
HEART RATE: 63 BPM | RESPIRATION RATE: 18 BRPM | BODY MASS INDEX: 30.84 KG/M2 | HEIGHT: 75 IN | SYSTOLIC BLOOD PRESSURE: 138 MMHG | TEMPERATURE: 97 F | DIASTOLIC BLOOD PRESSURE: 82 MMHG | OXYGEN SATURATION: 98 % | WEIGHT: 248 LBS

## 2022-08-08 DIAGNOSIS — Z98.890 S/P ARTHROSCOPIC SURGERY OF LEFT KNEE: Primary | ICD-10-CM

## 2022-08-08 PROCEDURE — 6360000002 HC RX W HCPCS

## 2022-08-08 PROCEDURE — 3700000001 HC ADD 15 MINUTES (ANESTHESIA): Performed by: ORTHOPAEDIC SURGERY

## 2022-08-08 PROCEDURE — 3600000013 HC SURGERY LEVEL 3 ADDTL 15MIN: Performed by: ORTHOPAEDIC SURGERY

## 2022-08-08 PROCEDURE — 6360000002 HC RX W HCPCS: Performed by: ORTHOPAEDIC SURGERY

## 2022-08-08 PROCEDURE — 7100000011 HC PHASE II RECOVERY - ADDTL 15 MIN: Performed by: ORTHOPAEDIC SURGERY

## 2022-08-08 PROCEDURE — 2580000003 HC RX 258

## 2022-08-08 PROCEDURE — 2709999900 HC NON-CHARGEABLE SUPPLY: Performed by: ORTHOPAEDIC SURGERY

## 2022-08-08 PROCEDURE — 29877 ARTHRS KNEE SURG DBRDMT/SHVG: CPT | Performed by: ORTHOPAEDIC SURGERY

## 2022-08-08 PROCEDURE — 7100000010 HC PHASE II RECOVERY - FIRST 15 MIN: Performed by: ORTHOPAEDIC SURGERY

## 2022-08-08 PROCEDURE — 3600000003 HC SURGERY LEVEL 3 BASE: Performed by: ORTHOPAEDIC SURGERY

## 2022-08-08 PROCEDURE — 2500000003 HC RX 250 WO HCPCS: Performed by: ORTHOPAEDIC SURGERY

## 2022-08-08 PROCEDURE — 3700000000 HC ANESTHESIA ATTENDED CARE: Performed by: ORTHOPAEDIC SURGERY

## 2022-08-08 PROCEDURE — 2500000003 HC RX 250 WO HCPCS

## 2022-08-08 RX ORDER — OXYCODONE HYDROCHLORIDE AND ACETAMINOPHEN 5; 325 MG/1; MG/1
1 TABLET ORAL EVERY 6 HOURS PRN
Qty: 12 TABLET | Refills: 0 | Status: SHIPPED | OUTPATIENT
Start: 2022-08-08 | End: 2022-08-11

## 2022-08-08 RX ORDER — PROPOFOL 10 MG/ML
INJECTION, EMULSION INTRAVENOUS CONTINUOUS PRN
Status: DISCONTINUED | OUTPATIENT
Start: 2022-08-08 | End: 2022-08-08 | Stop reason: SDUPTHER

## 2022-08-08 RX ORDER — BUPIVACAINE HYDROCHLORIDE 2.5 MG/ML
INJECTION, SOLUTION EPIDURAL; INFILTRATION; INTRACAUDAL PRN
Status: DISCONTINUED | OUTPATIENT
Start: 2022-08-08 | End: 2022-08-08 | Stop reason: ALTCHOICE

## 2022-08-08 RX ORDER — LIDOCAINE HYDROCHLORIDE 10 MG/ML
INJECTION, SOLUTION INFILTRATION; PERINEURAL PRN
Status: DISCONTINUED | OUTPATIENT
Start: 2022-08-08 | End: 2022-08-08 | Stop reason: ALTCHOICE

## 2022-08-08 RX ORDER — FENTANYL CITRATE 50 UG/ML
INJECTION, SOLUTION INTRAMUSCULAR; INTRAVENOUS PRN
Status: DISCONTINUED | OUTPATIENT
Start: 2022-08-08 | End: 2022-08-08 | Stop reason: SDUPTHER

## 2022-08-08 RX ORDER — SODIUM CHLORIDE 0.9 % (FLUSH) 0.9 %
5-40 SYRINGE (ML) INJECTION EVERY 12 HOURS SCHEDULED
Status: DISCONTINUED | OUTPATIENT
Start: 2022-08-08 | End: 2022-08-08 | Stop reason: HOSPADM

## 2022-08-08 RX ORDER — SODIUM CHLORIDE 9 MG/ML
INJECTION, SOLUTION INTRAVENOUS PRN
Status: CANCELLED | OUTPATIENT
Start: 2022-08-08

## 2022-08-08 RX ORDER — GLYCOPYRROLATE 0.2 MG/ML
INJECTION INTRAMUSCULAR; INTRAVENOUS PRN
Status: DISCONTINUED | OUTPATIENT
Start: 2022-08-08 | End: 2022-08-08 | Stop reason: SDUPTHER

## 2022-08-08 RX ORDER — SODIUM CHLORIDE 9 MG/ML
INJECTION, SOLUTION INTRAVENOUS PRN
Status: DISCONTINUED | OUTPATIENT
Start: 2022-08-08 | End: 2022-08-08 | Stop reason: HOSPADM

## 2022-08-08 RX ORDER — KETAMINE HYDROCHLORIDE 10 MG/ML
INJECTION, SOLUTION INTRAMUSCULAR; INTRAVENOUS PRN
Status: DISCONTINUED | OUTPATIENT
Start: 2022-08-08 | End: 2022-08-08 | Stop reason: SDUPTHER

## 2022-08-08 RX ORDER — SODIUM CHLORIDE 0.9 % (FLUSH) 0.9 %
5-40 SYRINGE (ML) INJECTION PRN
Status: DISCONTINUED | OUTPATIENT
Start: 2022-08-08 | End: 2022-08-08 | Stop reason: HOSPADM

## 2022-08-08 RX ORDER — ONDANSETRON 2 MG/ML
4 INJECTION INTRAMUSCULAR; INTRAVENOUS
Status: CANCELLED | OUTPATIENT
Start: 2022-08-08 | End: 2022-08-08

## 2022-08-08 RX ORDER — MIDAZOLAM HYDROCHLORIDE 1 MG/ML
INJECTION INTRAMUSCULAR; INTRAVENOUS PRN
Status: DISCONTINUED | OUTPATIENT
Start: 2022-08-08 | End: 2022-08-08 | Stop reason: SDUPTHER

## 2022-08-08 RX ORDER — SODIUM CHLORIDE 0.9 % (FLUSH) 0.9 %
5-40 SYRINGE (ML) INJECTION PRN
Status: CANCELLED | OUTPATIENT
Start: 2022-08-08

## 2022-08-08 RX ORDER — SODIUM CHLORIDE 9 MG/ML
INJECTION, SOLUTION INTRAVENOUS CONTINUOUS PRN
Status: DISCONTINUED | OUTPATIENT
Start: 2022-08-08 | End: 2022-08-08 | Stop reason: SDUPTHER

## 2022-08-08 RX ORDER — SODIUM CHLORIDE 0.9 % (FLUSH) 0.9 %
5-40 SYRINGE (ML) INJECTION EVERY 12 HOURS SCHEDULED
Status: CANCELLED | OUTPATIENT
Start: 2022-08-08

## 2022-08-08 RX ADMIN — SODIUM CHLORIDE: 9 INJECTION, SOLUTION INTRAVENOUS at 08:42

## 2022-08-08 RX ADMIN — FENTANYL CITRATE 50 MCG: 50 INJECTION, SOLUTION INTRAMUSCULAR; INTRAVENOUS at 08:51

## 2022-08-08 RX ADMIN — GLYCOPYRROLATE 0.2 MG: 0.2 INJECTION INTRAMUSCULAR; INTRAVENOUS at 09:00

## 2022-08-08 RX ADMIN — MIDAZOLAM 2 MG: 1 INJECTION INTRAMUSCULAR; INTRAVENOUS at 08:42

## 2022-08-08 RX ADMIN — PROPOFOL 100 MCG/KG/MIN: 10 INJECTION, EMULSION INTRAVENOUS at 08:51

## 2022-08-08 RX ADMIN — GLYCOPYRROLATE 0.2 MG: 0.2 INJECTION INTRAMUSCULAR; INTRAVENOUS at 08:51

## 2022-08-08 RX ADMIN — FENTANYL CITRATE 50 MCG: 50 INJECTION, SOLUTION INTRAMUSCULAR; INTRAVENOUS at 09:00

## 2022-08-08 RX ADMIN — KETAMINE HYDROCHLORIDE 20 MG: 10 INJECTION INTRAMUSCULAR; INTRAVENOUS at 08:59

## 2022-08-08 RX ADMIN — KETAMINE HYDROCHLORIDE 30 MG: 10 INJECTION INTRAMUSCULAR; INTRAVENOUS at 09:00

## 2022-08-08 ASSESSMENT — PAIN DESCRIPTION - LOCATION: LOCATION: KNEE

## 2022-08-08 ASSESSMENT — PAIN DESCRIPTION - ORIENTATION: ORIENTATION: LEFT

## 2022-08-08 ASSESSMENT — PAIN SCALES - GENERAL: PAINLEVEL_OUTOF10: 0

## 2022-08-08 ASSESSMENT — PAIN DESCRIPTION - PAIN TYPE: TYPE: SURGICAL PAIN

## 2022-08-08 NOTE — PROGRESS NOTES
PT RECEIVED IN PACU 2 FROM SURGERY REPORT RECEIVED ASSESSMENT AND VS OBTAINED FAMILY CALLED TO BE WITH PT , ICE TO LEFT KNEE NOURISHMENT GIVEN  1025 DISCHARGE INSTRUCTIONS GIVEN TO PT AND HIS WIFE BOTH VEBALIZED UNDERSTANDING OF INSTRUCTIONS PAMPHLETS GIVEN

## 2022-08-08 NOTE — DISCHARGE INSTRUCTIONS
Weight bearing and motion as tolerated, rest ice elevation as needed. Tylenol or percocet as needed for pain  Keep dressing clean and dry, remove in 2-3 days and then shower  Follow up with dr samuel next week as scheduled, call if any problems or questions meanwhile.

## 2022-08-08 NOTE — OP NOTE
Operative Note      Patient: Jace Dumont  YOB: 1970  MRN: [id number]    Date of Procedure: 8/8/2022    Pre-Op Diagnosis: Osteoarthritis, unspecified osteoarthritis type, unspecified site [M19.90]    Post-Op Diagnosis: Same       Procedure(s):  LEFT KNEE ARTHROSCOPY    Surgeon(s):  Cody Lange MD    Assistant:   Resident: Janell Guallpa DO    Anesthesia: Monitor Anesthesia Care    Estimated Blood Loss (mL): Minimal    Complications: None    Specimens:   * No specimens in log *    Implants:  * No implants in log *      Drains: * No LDAs found *    Findings: Chronic grade IV chondromalacia patellofemoral joint, diffuse synovitis with unstable grade III chondromalacia medial and lateral compartments. OPERATIVE INDICATIONS: Healthy active 49-year-old male with acute and persistent medial left knee pain since twisting mechanism, pain has not responded to reasonable nonsurgical means including medication activity modification or steroid injection. Has a known history of end-stage patellofemoral arthritis treated arthroscopically in the past, the symptoms are new more at the medial aspect and consistent with new internal derangement. Arthroscopy is indicated refractory pain. OPERATIVE PROCEDURE: Patient was identified placed on the operating table and administered intravenous sedation. Local injection of lidocaine and Marcaine and morphine was performed into the left knee and portal sites were injected 1% plain lidocaine. Tourniquet was placed on left thigh and the knee and leg were prepped and draped in the usual sterile fashion. Tourniquet was inserted through the anterolateral portal and the knee was distended with fluid. The knee showed diffuse moderately proliferative hyperemic synovitis especially of the medial compartment, patella femoral joint showed extensive grade 4 change consistent with previous exams. ACL showed partial tearing but was predominantly intact.   Medial gutter

## 2022-08-08 NOTE — PROGRESS NOTES
CLINICAL PHARMACY NOTE: MEDS TO BEDS    Total # of Prescriptions Filled: 1   The following medications were delivered to the patient:  Percocet 5/325    Additional Documentation:   2 different NDC's given to patient.

## 2022-08-08 NOTE — BRIEF OP NOTE
Brief Postoperative Note      Patient: Justa Parra  YOB: 1970  MRN: [id number]    Date of Procedure: 8/8/2022    Pre-Op Diagnosis: Osteoarthritis, unspecified osteoarthritis type, unspecified site [M19.90]    Post-Op Diagnosis: Same       Procedure(s):  LEFT KNEE ARTHROSCOPY    Surgeon(s):  MD Isiah Vázquez MD    Assistant:  Resident: Brock oNel DO    Anesthesia: Monitor Anesthesia Care    Estimated Blood Loss (mL): Minimal    Complications: None    Specimens:   * No specimens in log *    Implants:  * No implants in log *      Drains: * No LDAs found *    Findings: see detailed op note    Electronically signed by Pamela Steele DO on 8/8/2022 at 9:52 AM

## 2022-08-08 NOTE — BRIEF OP NOTE
Brief Postoperative Note      Patient: Dawn Wolfe  YOB: 1970  MRN: [id number]    Date of Procedure: 8/8/2022    Pre-Op Diagnosis: Osteoarthritis, unspecified osteoarthritis type, unspecified site [M19.90]    Post-Op Diagnosis: Same       Procedure(s):  LEFT KNEE ARTHROSCOPY    Surgeon(s):  Remedios Izquierdo MD    Assistant:  Resident: John Beltran DO, Cr Izquierdo DO    Anesthesia: Monitor Anesthesia Care    Estimated Blood Loss (mL): Minimal    Complications: None    Specimens:   * No specimens in log *    Implants:  * No implants in log *      Drains: * No LDAs found *    Findings: See detailed op note    Electronically signed by Cr Izquierdo DO on 8/8/2022 at 1:30 PM

## 2022-08-08 NOTE — ANESTHESIA POSTPROCEDURE EVALUATION
Department of Anesthesiology  Postprocedure Note    Patient: Som Rinaldi  MRN: [id number]  YOB: 1970  Date of evaluation: 8/8/2022      Procedure Summary     Date: 08/08/22 Room / Location: [facility] OR 04 / [facility]    Anesthesia Start: 9983 Anesthesia Stop: 1306    Procedure: LEFT KNEE ARTHROSCOPY (Left: Knee) Diagnosis:       Osteoarthritis, unspecified osteoarthritis type, unspecified site      (Osteoarthritis, unspecified osteoarthritis type, unspecified site [M19.90])    Surgeons: Sariah Paredes MD Responsible Provider: Prosper Scanlon DO    Anesthesia Type: MAC ASA Status: 2          Anesthesia Type: No value filed.     Fior Phase I: Fior Score: 10    Fior Phase II:        Anesthesia Post Evaluation    Patient location during evaluation: PACU  Patient participation: complete - patient participated  Level of consciousness: awake and alert  Pain score: 1  Airway patency: patent  Nausea & Vomiting: no nausea and no vomiting  Complications: no  Cardiovascular status: hemodynamically stable  Respiratory status: acceptable

## 2022-08-08 NOTE — ANESTHESIA PRE PROCEDURE
Department of Anesthesiology  Preprocedure Note       Name:  Tal Villa   Age:  45 y.o.  :  1970                                          MRN:  [id number]         Date:  2022      Surgeon: Jesús Swanson):  MD Gwen Camejo MD    Procedure: Procedure(s):  LEFT KNEE ARTHROSCOPY    Medications prior to admission:   Prior to Admission medications    Medication Sig Start Date End Date Taking? Authorizing Provider   ketoconazole (NIZORAL) 2 % shampoo ketoconazole 2 % shampoo   APPLY TO THE AFFECTED AREA DAILY/AS NEEDED. ADVISED TO LET SIT 15 MINUTES PRIOR TO WASHING OUT. Historical Provider, MD   Diclofenac Sodium 2 % SOLN Apply topically 2 times daily as needed    Historical Provider, MD   diclofenac sodium (VOLTAREN) 1 % GEL Apply 4 g topically 4 times daily as needed (pain) 10/12/20 8/3/22  Dolly Ramos MD   melatonin 3 MG TABS tablet Take 3 mg by mouth nightly as needed    Historical Provider, MD   ibuprofen (ADVIL;MOTRIN) 200 MG tablet Take 200 mg by mouth every 6 hours as needed for Pain    Historical Provider, MD       Current medications:    Current Facility-Administered Medications   Medication Dose Route Frequency Provider Last Rate Last Admin    sodium chloride flush 0.9 % injection 5-40 mL  5-40 mL IntraVENous 2 times per day Yessica Arteaga MD        sodium chloride flush 0.9 % injection 5-40 mL  5-40 mL IntraVENous PRN Yessica Arteaga MD        0.9 % sodium chloride infusion   IntraVENous PRN Yessica Arteaga MD           Allergies: Allergies   Allergen Reactions    Bee Venom Anaphylaxis       Problem List:    Patient Active Problem List   Diagnosis Code    Bilateral shoulder pain M25.511, M25.512       Past Medical History:        Diagnosis Date    Low back pain     herniated disc       Past Surgical History:        Procedure Laterality Date    KNEE ARTHROSCOPY Right     KNEE ARTHROSCOPY Left 2015    Left knee scope. Marcia Garcia MD    NASAL FRACTURE SURGERY      as child       Social History:    Social History     Tobacco Use    Smoking status: Former     Types: Cigarettes    Smokeless tobacco: Current     Types: Snuff   Substance Use Topics    Alcohol use: Yes     Alcohol/week: 2.0 - 3.0 standard drinks     Types: 2 - 3 Glasses of wine per week     Comment: occasionally                                Ready to quit: Not Answered  Counseling given: Not Answered      Vital Signs (Current):   Vitals:    08/03/22 1222 08/08/22 0715   BP:  (!) 152/83   Pulse:  55   Resp:  18   Temp:  36.1 °C (97 °F)   TempSrc:  Temporal   SpO2:  98%   Weight: 248 lb (112.5 kg)    Height: 6' 3\" (1.905 m)                                               BP Readings from Last 3 Encounters:   08/08/22 (!) 152/83   10/12/20 122/84   08/10/20 132/78       NPO Status: Time of last liquid consumption: 2100                        Time of last solid consumption: 1830                        Date of last liquid consumption: 08/07/22                        Date of last solid food consumption: 08/07/22    BMI:   Wt Readings from Last 3 Encounters:   08/03/22 248 lb (112.5 kg)   06/21/22 248 lb (112.5 kg)   10/12/20 245 lb (111.1 kg)     Body mass index is 31 kg/m². CBC: No results found for: WBC, RBC, HGB, HCT, MCV, RDW, PLT    CMP: No results found for: NA, K, CL, CO2, BUN, CREATININE, GFRAA, AGRATIO, LABGLOM, GLUCOSE, GLU, PROT, CALCIUM, BILITOT, ALKPHOS, AST, ALT    POC Tests: No results for input(s): POCGLU, POCNA, POCK, POCCL, POCBUN, POCHEMO, POCHCT in the last 72 hours.     Coags: No results found for: PROTIME, INR, APTT    HCG (If Applicable): No results found for: PREGTESTUR, PREGSERUM, HCG, HCGQUANT     ABGs: No results found for: PHART, PO2ART, JSJ2KJD, NKJ4ELH, BEART, E5BLIIEK     Type & Screen (If Applicable):  No results found for: LABABO, LABRH    Drug/Infectious Status (If Applicable):  No results found for: HIV, HEPCAB    COVID-19 Screening (If Applicable): No results found for: COVID19    EKG: 10/6/19  Normal sinus rhythm  Normal ECG  No previous ECGs available  Confirmed by Edgardoa Host ([id number]) on 10/8/2019 6:24:37 AM    Anesthesia Evaluation  Patient summary reviewed and Nursing notes reviewed no history of anesthetic complications:   Airway: Mallampati: II  TM distance: >3 FB   Neck ROM: full  Mouth opening: > = 3 FB   Dental:          Pulmonary:Negative Pulmonary ROS breath sounds clear to auscultation                             Cardiovascular:  Exercise tolerance: good (>4 METS),         ECG reviewed  Rhythm: regular  Rate: normal           Beta Blocker:  Not on Beta Blocker         Neuro/Psych:   Negative Neuro/Psych ROS              GI/Hepatic/Renal: Neg GI/Hepatic/Renal ROS            Endo/Other:    (+) : arthritis: OA., .                  ROS comment: Chronic left knee pain with patellofemoral arthritis. Abdominal:             Vascular: negative vascular ROS. Other Findings:           Anesthesia Plan      MAC     ASA 2       Induction: intravenous. Anesthetic plan and risks discussed with patient. Use of blood products discussed with patient whom consented to blood products. Plan discussed with attending and CRNA. Eric Pérez RN   8/8/2022    Patient seen and examined, chart reviewed, agree with above findings. Anesthetic plan, risks, benefits, alternatives, and personnel involved discussed with patient. Patient verbalized an understanding and agreed to proceed. NPO status confirmed. Anesthetic plan discussed with care team members and agreed upon. Valeria Lua DO   8/8/2022  8:15 AM        DOS STAFF ADDENDUM:    Patient seen and chart reviewed. No interval change in history or exam.   Anesthesia plan discussed, risk/benefits addressed. Patient's concerns and questions answered.      DEENA Gamez - CRNA  August 8, 2022  8:17 AM

## 2022-08-08 NOTE — INTERVAL H&P NOTE
Update History & Physical    The patient's History and Physical of August 4, 2022 was reviewed with the patient and I examined the patient. There was no change. The surgical site was confirmed by the patient and me. Plan: The risks, benefits, expected outcome, and alternative to the recommended procedure have been discussed with the patient. Patient understands and wants to proceed with the procedure.      Electronically signed by Kassandra Patel MD on 8/8/2022 at 8:40 AM

## 2022-08-16 ENCOUNTER — OFFICE VISIT (OUTPATIENT)
Dept: ORTHOPEDIC SURGERY | Age: 52
End: 2022-08-16

## 2022-08-16 VITALS — BODY MASS INDEX: 31.08 KG/M2 | WEIGHT: 250 LBS | HEIGHT: 75 IN

## 2022-08-16 DIAGNOSIS — Z98.890 S/P LEFT KNEE ARTHROSCOPY: Primary | ICD-10-CM

## 2022-08-16 PROCEDURE — 99024 POSTOP FOLLOW-UP VISIT: CPT | Performed by: ORTHOPAEDIC SURGERY

## 2022-08-16 NOTE — PROGRESS NOTES
Chief Complaint:   Chief Complaint   Patient presents with    Post-Op Check     Eight days out left knee arthroscopy. Medial and lateral pain he had prior to surgery is gone. C/o some anterior knee pain. Windy Huddleston is about 8 days status post arthroscopy of the right knee, intraoperatively there was significant synovitis, also unstable grade III chondromalacia both medial and lateral compartments although menisci were intact, again noting extensive grade 4 loss of cartilage on the patella and trochlear aspects. Chondroplasty and debridement of the medial gutter were performed, patient states medial and lateral pain are improved although anterior pain and discomfort is persistent. Denies fever chills sweats chest pain or dyspnea. Allergies; medications; past medical, surgical, family, and social history; and problem list have been reviewed today and updated as indicated in this encounter seen below. Exam: Left knee is benign, portals are healing without erythema fluctuance or drainage, no effusion today. Range of motion is normal with mild medial and lateral tenderness to palpation patellar tracking is neutral.    Radiographs: Deferred today    Edwin was seen today for post-op check. Diagnoses and all orders for this visit:    S/P left knee arthroscopy       Intraoperative findings were reviewed with the patient, we talked about the tricompartmental involvement which while not end-stage in the weightbearing portions is advanced enough that should he have persisting issues he should consider himself a candidate for total knee arthroplasty. In the meantime sutures were removed and home exercises were advised especially with low impact nonweightbearing such as stationary bike. Questions asked and answered follow-up in a month repeat exam.    Return in about 1 month (around 9/16/2022).      Current Outpatient Medications   Medication Sig Dispense Refill    ketoconazole (NIZORAL) 2 % shampoo ketoconazole 2 % shampoo   APPLY TO THE AFFECTED AREA DAILY/AS NEEDED. ADVISED TO LET SIT 15 MINUTES PRIOR TO WASHING OUT.      melatonin 3 MG TABS tablet Take 3 mg by mouth nightly as needed      ibuprofen (ADVIL;MOTRIN) 200 MG tablet Take 200 mg by mouth every 6 hours as needed for Pain       No current facility-administered medications for this visit. Patient Active Problem List   Diagnosis    Bilateral shoulder pain    S/P left knee arthroscopy       Past Medical History:   Diagnosis Date    Low back pain     herniated disc       Past Surgical History:   Procedure Laterality Date    KNEE ARTHROSCOPY Right 2013    KNEE ARTHROSCOPY Left 09/04/2015    Left knee scope. Theone Wright. Perez Beavers MD    KNEE ARTHROSCOPY Left 8/8/2022    LEFT KNEE ARTHROSCOPY performed by Lion Herring MD at [facility]      as child       Allergies   Allergen Reactions    Bee Venom Anaphylaxis       Social History     Socioeconomic History    Marital status:      Spouse name: None    Number of children: None    Years of education: None    Highest education level: None   Tobacco Use    Smoking status: Former     Types: Cigarettes    Smokeless tobacco: Current     Types: Snuff   Vaping Use    Vaping Use: Never used   Substance and Sexual Activity    Alcohol use: Yes     Alcohol/week: 2.0 - 3.0 standard drinks     Types: 2 - 3 Glasses of wine per week     Comment: occasionally    Drug use: No    Sexual activity: Yes       Family History   Adopted: Yes         Review of Systems  As follows except as previously noted in HPI:  Constitutional: Negative for chills, diaphoresis, fatigue, fever and unexpected weight change. Respiratory: Negative for cough, shortness of breath and wheezing. Cardiovascular: Negative for chest pain and palpitations. Neurological: Negative for dizziness, syncope, cephalgia.   GI / : negative  Musculoskeletal: see HPI       Objective:   Physical Exam   Constitutional: Oriented to person, place, and time. and appears well-developed and well-nourished. :   Head: Normocephalic and atraumatic. Eyes: EOM are normal.   Neck: Neck supple. Cardiovascular: Normal rate and regular rhythm. Pulmonary/Chest: Effort normal. No stridor. No respiratory distress, no wheezes. Abdominal:  No abnormal distension. Neurological: Alert and oriented to person, place, and time. Skin: Skin is warm and dry. Psychiatric: Normal mood and affect.  Behavior is normal. Thought content normal.    8/16/2022  12:07 PM

## 2022-08-17 ENCOUNTER — TELEPHONE (OUTPATIENT)
Dept: ORTHOPEDIC SURGERY | Age: 52
End: 2022-08-17

## 2022-08-17 NOTE — TELEPHONE ENCOUNTER
8/16/2022 3:35 pm Patient phoned the office / Message left on voice mail: I saw Dr. Adrián Echeverria this morning. Follow up on my knee scope. I forgot to ask, I have two questions: What are my limitations? What about flying and blood clots? 8/17/2022 7:30 am Follow up phone call to [phone] / General Abide w/ and informed patient: Typically and referring to our post op [facility] patient's we tell them they are at risk for blood clots 4 to 6 weeks after surgery. Dr. Yves Waterman instructions to them are as follows:  - Take one baby aspirin twice a day (every 12 hours) for 4 weeks  - Wear antiembolic stockings during the day (can be purchased at the [employer])  - Drink plenty of water  - Do circulation exercises every 1 to 2 hours when sitting  - For long distance trips, get up and walk around at least every 1 to 2 hours. Patient expresses understanding. Patient reports: I do not plan on returning to work until September 4 th, 11 th or 6 th. I am a , in an emergency, I would not be able to push down on the roaters. Informed the patient: Will send a message to the doctor / Will call w/ any further instructions. Patient is in agreement w/ the plan.

## 2022-08-17 NOTE — TELEPHONE ENCOUNTER
8/17/2022 8:58 am Follow up phone call / Message left on patient's voice mail: Informed of [facility]'s response.

## 2022-09-13 ENCOUNTER — OFFICE VISIT (OUTPATIENT)
Dept: ORTHOPEDIC SURGERY | Age: 52
End: 2022-09-13
Payer: COMMERCIAL

## 2022-09-13 VITALS — HEIGHT: 75 IN | WEIGHT: 250 LBS | BODY MASS INDEX: 31.08 KG/M2

## 2022-09-13 DIAGNOSIS — Z98.890 S/P LEFT KNEE ARTHROSCOPY: Primary | ICD-10-CM

## 2022-09-13 PROCEDURE — 20610 DRAIN/INJ JOINT/BURSA W/O US: CPT | Performed by: ORTHOPAEDIC SURGERY

## 2022-09-13 PROCEDURE — 99024 POSTOP FOLLOW-UP VISIT: CPT | Performed by: ORTHOPAEDIC SURGERY

## 2022-09-13 RX ORDER — TRIAMCINOLONE ACETONIDE 40 MG/ML
80 INJECTION, SUSPENSION INTRA-ARTICULAR; INTRAMUSCULAR ONCE
Status: COMPLETED | OUTPATIENT
Start: 2022-09-13 | End: 2022-09-13

## 2022-09-13 RX ORDER — BUPIVACAINE HYDROCHLORIDE 2.5 MG/ML
3 INJECTION, SOLUTION INFILTRATION; PERINEURAL ONCE
Status: COMPLETED | OUTPATIENT
Start: 2022-09-13 | End: 2022-09-13

## 2022-09-13 RX ADMIN — TRIAMCINOLONE ACETONIDE 80 MG: 40 INJECTION, SUSPENSION INTRA-ARTICULAR; INTRAMUSCULAR at 10:16

## 2022-09-13 RX ADMIN — BUPIVACAINE HYDROCHLORIDE 7.5 MG: 2.5 INJECTION, SOLUTION INFILTRATION; PERINEURAL at 10:11

## 2022-09-13 NOTE — PROGRESS NOTES
Chief Complaint:   Chief Complaint   Patient presents with    Post-Op Check     FU left knee scope 8/8/2022. Has pain getting out of bed and getting up from sitting position. States pain medial side of left knee, burning in nature. Discuss possible injection left knee and timing. Johnny Gutierrez is little over 1 month after left knee arthroscopy and chondroplasty, again noting intraoperative findings of end-stage patellofemoral chondromalacia. States that medial and lateral preoperative pain however significantly relieved although anterior infrapatellar symptoms are persistent especially with sit to stand transfers and stairs. He is taking ibuprofen on occasion with partial relief. No fever chills sweats or other systemic symptom. Allergies; medications; past medical, surgical, family, and social history; and problem list have been reviewed today and updated as indicated in this encounter seen below. Exam: Left knee shows no erythema or effusion, no joint line tenderness, full range of motion, significant retropatellar crepitus on apprehension testing. Mild infrapatellar soft tissue swelling and retropatellar tenderness. Radiographs: Deferred today    Edwin was seen today for post-op check. Diagnoses and all orders for this visit:    S/P left knee arthroscopy  -     MN ARTHROCENTESIS ASPIR&/INJ MAJOR JT/BURSA W/O US    Other orders  -     triamcinolone acetonide (KENALOG-40) injection 80 mg  -     bupivacaine (MARCAINE) 0.25 % injection 7.5 mg       Intraoperative findings were again reviewed with the patient, he states he is overall much better but continues to have some anterior compartment difficulty, we discussed injections as another alternative which she requested and I performed today with Kenalog and Marcaine into the left knee through an anterolateral approach no difficulty or complication tolerated well.   He is aware that he may be heading towards total knee arthroplasty on an elective basis and we discussed that in some detail today as well. Questions asked and answered follow-up as needed. Return if symptoms worsen or fail to improve. Current Outpatient Medications   Medication Sig Dispense Refill    ketoconazole (NIZORAL) 2 % shampoo ketoconazole 2 % shampoo   APPLY TO THE AFFECTED AREA DAILY/AS NEEDED. ADVISED TO LET SIT 15 MINUTES PRIOR TO WASHING OUT.      melatonin 3 MG TABS tablet Take 3 mg by mouth nightly as needed      ibuprofen (ADVIL;MOTRIN) 200 MG tablet Take 200 mg by mouth every 6 hours as needed for Pain       No current facility-administered medications for this visit. Patient Active Problem List   Diagnosis    Bilateral shoulder pain    S/P left knee arthroscopy       Past Medical History:   Diagnosis Date    Low back pain     herniated disc       Past Surgical History:   Procedure Laterality Date    KNEE ARTHROSCOPY Right 2013    KNEE ARTHROSCOPY Left 09/04/2015    Left knee scope. Devan Lozoya. Jp Walden MD    KNEE ARTHROSCOPY Left 8/8/2022    LEFT KNEE ARTHROSCOPY performed by Shane Vail MD at [facility]      as child       Allergies   Allergen Reactions    Bee Venom Anaphylaxis       Social History     Socioeconomic History    Marital status:      Spouse name: None    Number of children: None    Years of education: None    Highest education level: None   Tobacco Use    Smoking status: Former     Types: Cigarettes    Smokeless tobacco: Current     Types: Snuff   Vaping Use    Vaping Use: Never used   Substance and Sexual Activity    Alcohol use: Yes     Alcohol/week: 2.0 - 3.0 standard drinks     Types: 2 - 3 Glasses of wine per week     Comment: occasionally    Drug use: No    Sexual activity: Yes       Family History   Adopted: Yes         Review of Systems  As follows except as previously noted in HPI:  Constitutional: Negative for chills, diaphoresis, fatigue, fever and unexpected weight change.    Respiratory: Negative for cough, shortness of breath and wheezing. Cardiovascular: Negative for chest pain and palpitations. Neurological: Negative for dizziness, syncope, cephalgia. GI / : negative  Musculoskeletal: see HPI       Objective:   Physical Exam   Constitutional: Oriented to person, place, and time. and appears well-developed and well-nourished. :   Head: Normocephalic and atraumatic. Eyes: EOM are normal.   Neck: Neck supple. Cardiovascular: Normal rate and regular rhythm. Pulmonary/Chest: Effort normal. No stridor. No respiratory distress, no wheezes. Abdominal:  No abnormal distension. Neurological: Alert and oriented to person, place, and time. Skin: Skin is warm and dry. Psychiatric: Normal mood and affect.  Behavior is normal. Thought content normal.    9/13/2022  10:36 AM

## 2022-12-26 ASSESSMENT — KOOS JR
HOW SEVERE IS YOUR KNEE STIFFNESS AFTER FIRST WAKING IN MORNING: 2
BENDING TO THE FLOOR TO PICK UP OBJECT: 0
STANDING UPRIGHT: 1
RISING FROM SITTING: 1
TWISING OR PIVOTING ON KNEE: 1
GOING UP OR DOWN STAIRS: 1
STRAIGHTENING KNEE FULLY: 2
KOOS JR TOTAL INTERVAL SCORE: 65.994

## 2022-12-26 ASSESSMENT — PROMIS GLOBAL HEALTH SCALE
WHO IS THE PERSON COMPLETING THE PROMIS V1.1 SURVEY?: 0
SUM OF RESPONSES TO QUESTIONS 3, 6, 7, & 8: 17
IN THE PAST 7 DAYS, HOW OFTEN HAVE YOU BEEN BOTHERED BY EMOTIONAL PROBLEMS, SUCH AS FEELING ANXIOUS, DEPRESSED, OR IRRITABLE [ON A SCALE FROM 1 (NEVER) TO 5 (ALWAYS)]?: 1
HOW IS THE PROMIS V1.1 BEING ADMINISTERED?: 2
TO WHAT EXTENT ARE YOU ABLE TO CARRY OUT YOUR EVERYDAY PHYSICAL ACTIVITIES SUCH AS WALKING, CLIMBING STAIRS, CARRYING GROCERIES, OR MOVING A CHAIR [ON A SCALE OF 1 (NOT AT ALL) TO 5 (COMPLETELY)]?: 5
IN GENERAL, HOW WOULD YOU RATE YOUR PHYSICAL HEALTH [ON A SCALE OF 1 (POOR) TO 5 (EXCELLENT)]?: 3
IN THE PAST 7 DAYS, HOW WOULD YOU RATE YOUR FATIGUE ON AVERAGE [ON A SCALE FROM 1 (NONE) TO 5 (VERY SEVERE)]?: 4
IN THE PAST 7 DAYS, HOW WOULD YOU RATE YOUR PAIN ON AVERAGE [ON A SCALE FROM 0 (NO PAIN) TO 10 (WORST IMAGINABLE PAIN)]?: 5
IN GENERAL, HOW WOULD YOU RATE YOUR SATISFACTION WITH YOUR SOCIAL ACTIVITIES AND RELATIONSHIPS [ON A SCALE OF 1 (POOR) TO 5 (EXCELLENT)]?: 5
IN GENERAL, WOULD YOU SAY YOUR HEALTH IS...[ON A SCALE OF 1 (POOR) TO 5 (EXCELLENT)]: 3
IN GENERAL, PLEASE RATE HOW WELL YOU CARRY OUT YOUR USUAL SOCIAL ACTIVITIES (INCLUDES ACTIVITIES AT HOME, AT WORK, AND IN YOUR COMMUNITY, AND RESPONSIBILITIES AS A PARENT, CHILD, SPOUSE, EMPLOYEE, FRIEND, ETC) [ON A SCALE OF 1 (POOR) TO 5 (EXCELLENT)]?: 4
IN GENERAL, HOW WOULD YOU RATE YOUR MENTAL HEALTH, INCLUDING YOUR MOOD AND YOUR ABILITY TO THINK [ON A SCALE OF 1 (POOR) TO 5 (EXCELLENT)]?: 5
SUM OF RESPONSES TO QUESTIONS 2, 4, 5, & 10: 13
IN GENERAL, WOULD YOU SAY YOUR QUALITY OF LIFE IS...[ON A SCALE OF 1 (POOR) TO 5 (EXCELLENT)]: 2

## 2022-12-27 ENCOUNTER — HOSPITAL ENCOUNTER (OUTPATIENT)
Dept: CT IMAGING | Age: 52
Discharge: HOME | End: 2022-12-29
Payer: COMMERCIAL

## 2022-12-27 DIAGNOSIS — M17.12 ARTHRITIS OF KNEE, LEFT: ICD-10-CM

## 2022-12-27 PROCEDURE — 73700 CT LOWER EXTREMITY W/O DYE: CPT

## 2022-12-29 PROBLEM — M17.12 PRIMARY OSTEOARTHRITIS OF LEFT KNEE: Status: ACTIVE | Noted: 2022-12-29

## 2023-01-03 NOTE — H&P
[id number] [facility]                       PREOPERATIVE HISTORY AND PHYSICAL    PATIENT NAME: Randy Irby                      :        1970  MED REC NO:   [id number]                            ROOM:  ACCOUNT NO:   [de-identified]                           ADMIT DATE: 2023  PROVIDER:     Fam Evans. SANTIAGO Garcia    DATE OF SURGERY:  2023. Dictating for Fatimah Zelaya M.D.    CHIEF COMPLAINT:  Left knee pain. HISTORY OF PRESENT ILLNESS:  This is a 20-year-old male with chronic  left knee pain secondary to osteoarthritis. He has failed conservative  treatments with anti-inflammatories, analgesics, injections, home  exercises, and bracing. He is now scheduled for [facility] robotic-assisted  left total knee arthroplasty. PAST MEDICAL HISTORY:  Hypertension. PAST SURGICAL HISTORY:  Arthroscopy of left knee. CURRENT MEDICATIONS:  Tizanidine, losartan, bupropion, and epinephrine. ALLERGIES:  None. FAMILY HISTORY:  Unremarkable. SOCIAL HISTORY:  Admits to prior tobacco use and admits to social  alcohol consumption. PRIMARY CARE PROVIDER:  Jesusita Ramos M.D. REVIEW OF SYSTEMS:  ALLERGIES:  As stated above. SKIN AND LYMPHATICS:  Denies rashes or lesions. CNS:  No prior CVAs. RESPIRATORY:  No cough or shortness of breath. CIRCULATORY:  No prior MIs.  DIGESTIVE:  No dyspepsia. GENITOURINARY:  No dysuria. METABOLIC AND ENDOCRINE:  No thyroid disease or diabetes. HEMATOLOGIC:  No anemia. MUSCULOSKELETAL:  Positive OA. PSYCHIATRIC:  Negative. PHYSICAL EXAMINATION:  GENERAL APPEARANCE:  A well-nourished and well-developed 20-year-old  male, in no acute distress. Alert and oriented x3. SKIN:  Without masses, rashes, or lesions. LYMPH NODES:  No adenopathy. HEENT:  Head:  Normocephalic and atraumatic. Ears:  Clear and  functional.  Eyes and fundi:  PERRLA.   Nose:  Clear without deviation. Mouth, teeth, and throat:  Clear and functional.  NECK:  Supple. No JVD. CHEST:  Normal excursion. BREASTS:  Deferred. LUNGS:  Clear to auscultation and percussion. HEART:  Regular rate and rhythm. No murmurs, gallops, or rubs  appreciated. ABDOMEN:  Rounded, soft, and nontender. Hernia negative. BACK:  Exam nontender. EXTREMITIES:  Without clubbing, cyanosis, or edema. Exam of left knee:   0 to 125 degrees of range of motion. Mild varus. Positive  patellofemoral and medial joint tenderness. Trace laxity. No effusion. Positive crepitus. 2/2 pulses. Neurovascular status distally is  intact. NEUROLOGIC:  Cranial nerves II through XII are grossly intact. GENITAL:  Exam deferred. RECTAL:  Exam deferred. DIAGNOSTIC IMPRESSION:  OA, left knee. PROCEDURE:  Left total knee arthroplasty.         Ehsan Lilly PA-C    D: 01/03/2023 8:28:56       T: 01/03/2023 9:09:42     [email or web address]  Job#: [id number]     Doc#: [id number]    CC:

## 2023-01-09 ENCOUNTER — ANESTHESIA EVENT (OUTPATIENT)
Dept: OPERATING ROOM | Age: 53
End: 2023-01-09
Payer: COMMERCIAL

## 2023-01-09 ENCOUNTER — HOSPITAL ENCOUNTER (OUTPATIENT)
Dept: PREADMISSION TESTING | Age: 53
Discharge: HOME | End: 2023-01-09
Payer: COMMERCIAL

## 2023-01-09 VITALS
BODY MASS INDEX: 30.76 KG/M2 | DIASTOLIC BLOOD PRESSURE: 79 MMHG | TEMPERATURE: 98.2 F | SYSTOLIC BLOOD PRESSURE: 134 MMHG | HEART RATE: 64 BPM | WEIGHT: 247.4 LBS | HEIGHT: 75 IN | OXYGEN SATURATION: 99 % | RESPIRATION RATE: 20 BRPM

## 2023-01-09 DIAGNOSIS — M17.12 PRIMARY OSTEOARTHRITIS OF LEFT KNEE: ICD-10-CM

## 2023-01-09 LAB
ANION GAP SERPL CALCULATED.3IONS-SCNC: 11 MMOL/L (ref 7–16)
BUN BLDV-MCNC: 14 MG/DL (ref 6–20)
CALCIUM SERPL-MCNC: 9.2 MG/DL (ref 8.6–10.2)
CHLORIDE BLD-SCNC: 101 MMOL/L (ref 98–107)
CO2: 24 MMOL/L (ref 22–29)
CREAT SERPL-MCNC: 1 MG/DL (ref 0.7–1.2)
GFR SERPL CREATININE-BSD FRML MDRD: >60 ML/MIN/1.73
GLUCOSE BLD-MCNC: 128 MG/DL (ref 74–99)
HCT VFR BLD CALC: 45.1 % (ref 37–54)
HEMOGLOBIN: 15.2 G/DL (ref 12.5–16.5)
MCH RBC QN AUTO: 29.3 PG (ref 26–35)
MCHC RBC AUTO-ENTMCNC: 33.7 % (ref 32–34.5)
MCV RBC AUTO: 87.1 FL (ref 80–99.9)
PDW BLD-RTO: 13.2 FL (ref 11.5–15)
PLATELET # BLD: 228 E9/L (ref 130–450)
PMV BLD AUTO: 9 FL (ref 7–12)
POTASSIUM SERPL-SCNC: 4.5 MMOL/L (ref 3.5–5)
RBC # BLD: 5.18 E12/L (ref 3.8–5.8)
SODIUM BLD-SCNC: 136 MMOL/L (ref 132–146)
WBC # BLD: 5.4 E9/L (ref 4.5–11.5)

## 2023-01-09 PROCEDURE — 36415 COLL VENOUS BLD VENIPUNCTURE: CPT

## 2023-01-09 PROCEDURE — 85027 COMPLETE CBC AUTOMATED: CPT

## 2023-01-09 PROCEDURE — 80048 BASIC METABOLIC PNL TOTAL CA: CPT

## 2023-01-09 PROCEDURE — 87081 CULTURE SCREEN ONLY: CPT

## 2023-01-09 RX ORDER — LOSARTAN POTASSIUM 50 MG/1
50 TABLET ORAL DAILY
COMMUNITY
Start: 2023-01-07

## 2023-01-09 RX ORDER — ROPIVACAINE HYDROCHLORIDE 5 MG/ML
30 INJECTION, SOLUTION EPIDURAL; INFILTRATION; PERINEURAL
Status: CANCELLED | OUTPATIENT
Start: 2023-01-09 | End: 2023-01-10

## 2023-01-09 RX ORDER — BUPROPION HYDROCHLORIDE 150 MG/1
TABLET, EXTENDED RELEASE ORAL
COMMUNITY
Start: 2023-01-07

## 2023-01-09 RX ORDER — LIDOCAINE HYDROCHLORIDE 10 MG/ML
10 INJECTION, SOLUTION INFILTRATION; PERINEURAL
Status: CANCELLED | OUTPATIENT
Start: 2023-01-09 | End: 2023-01-10

## 2023-01-09 RX ORDER — MIDAZOLAM HYDROCHLORIDE 2 MG/2ML
0.5 INJECTION, SOLUTION INTRAMUSCULAR; INTRAVENOUS PRN
Status: CANCELLED | OUTPATIENT
Start: 2023-01-09

## 2023-01-09 RX ORDER — FENTANYL CITRATE 50 UG/ML
25 INJECTION, SOLUTION INTRAMUSCULAR; INTRAVENOUS PRN
Status: CANCELLED | OUTPATIENT
Start: 2023-01-09

## 2023-01-09 RX ORDER — TIZANIDINE 4 MG/1
TABLET ORAL
COMMUNITY
Start: 2022-10-06

## 2023-01-09 ASSESSMENT — LIFESTYLE VARIABLES: SMOKING_STATUS: 0

## 2023-01-09 ASSESSMENT — KOOS JR
HOW SEVERE IS YOUR KNEE STIFFNESS AFTER FIRST WAKING IN MORNING: 3
STANDING UPRIGHT: 3
KOOS JR TOTAL INTERVAL SCORE: 34.174
RISING FROM SITTING: 3
STRAIGHTENING KNEE FULLY: 3
TWISING OR PIVOTING ON KNEE: 2
BENDING TO THE FLOOR TO PICK UP OBJECT: 3
GOING UP OR DOWN STAIRS: 4

## 2023-01-09 NOTE — PROGRESS NOTES
[facility] PRE-ADMISSION TESTING INSTRUCTIONS    The Preadmission Testing patient is instructed accordingly using the following criteria (check applicable):    ARRIVAL INSTRUCTIONS:  [x] Parking the day of Surgery is located in the Main Entrance lot. Upon entering the door, make an immediate right to the surgery reception desk    [x] Bring photo ID and insurance card    [x] Bring in a copy of Living will or Durable Power of  papers. [x] Please be sure to arrange for responsible adult to provide transportation to and from the hospital    [x] Please arrange for responsible adult to be with you for the 24 hour period post procedure due to having anesthesia    [x] If you awake am of surgery not feeling well or have temperature >100 please call [phone]    GENERAL INSTRUCTIONS:    [x] Nothing by mouth after midnight, including gum, candy, mints or water    [x] You may brush your teeth, but do not swallow any water    [] Take medications as instructed with 1-2 oz of water    [x] Stop herbal supplements and vitamins 5 days prior to procedure    [] Follow preop dosing of blood thinners per physician instructions    [] Take 1/2 dose of evening insulin, but no insulin after midnight    [] No oral diabetic medications after midnight    [] If diabetic and have low blood sugar or feel symptomatic, take 1-2oz apple juice only    [] Bring inhalers day of surgery    [] Bring C-PAP/ Bi-Pap day of surgery    [] Bring urine specimen day of surgery    [] Shower or bath with soap, lather and rinse well, AM of Surgery, no lotion, powders or creams to surgical site    [] Follow bowel prep as instructed per surgeon    [x] No tobacco products within 24 hours of surgery     [x] No alcohol or illegal drug use within 24 hours of surgery.     [x] Jewelry, body piercing's, eyeglasses, contact lenses and dentures are not permitted into surgery (bring cases)      [] Please do not wear any nail polish, make up or hair products on the day of surgery    [x] You can expect a call the business day prior to procedure to notify you if your arrival time changes    [x] If you receive a survey after surgery we would greatly appreciate your comments    [] Parent/guardian of a minor must accompany their child and remain on the premises  the entire time they are under our care     [] Pediatric patients may bring favorite toy, blanket or comfort item with them    [] A caregiver or family member must remain with the patient during their stay if they are mentally handicapped, have dementia, disoriented or unable to use a call light or would be a safety concern if left unattended    [x] Please notify surgeon if you develop any illness between now and time of surgery (cold, cough, sore throat, fever, nausea, vomiting) or any signs of infections  including skin, wounds, and dental.    [x]  The Outpatient Pharmacy is available to fill your prescription here on your day of surgery, ask your preop nurse for details    [x] Other instructions: wear loose,comfortable clothing    EDUCATIONAL MATERIALS PROVIDED:    [x] PAT Preoperative Education Packet/Booklet     [x] Medication List    [] Transfusion bracelet applied with instructions    [x] Shower with soap, lather and rinse well, and use CHG wipes provided the evening before surgery as instructed    [x] Incentive spirometer with instructions

## 2023-01-09 NOTE — ANESTHESIA PRE PROCEDURE
Department of Anesthesiology  Preprocedure Note       Name:  Kassandra Mancia   Age:  45 y.o.  :  1970                                          MRN:  [id number]         Date:  2023      Surgeon: Argenis Rogers):  Rosario Mccartney MD    Procedure: Procedure(s):  LEFT KNEE JENNIFER ROBOTIC ASSISTED TOTAL ARTHROPLASTY ++SERGIO++  ++PNB++    Medications prior to admission:   Prior to Admission medications    Medication Sig Start Date End Date Taking? Authorizing Provider   losartan (COZAAR) 50 MG tablet Take 50 mg by mouth daily 23   Historical Provider, MD   bu[facility]ion [facility]) 150 MG extended release tablet  23   Historical Provider, MD   tiZANidine (ZANAFLEX) 4 MG tablet TAKE ONE TABLET BY MOUTH THREE TIMES A DAY AS NEEDED 10/6/22   Historical Provider, MD   ketoconazole (NIZORAL) 2 % shampoo ketoconazole 2 % shampoo   APPLY TO THE AFFECTED AREA DAILY/AS NEEDED. ADVISED TO LET SIT 15 MINUTES PRIOR TO WASHING OUT. Historical Provider, MD   Diclofenac Sodium 2 % SOLN Apply topically 2 times daily as needed  22  Historical Provider, MD   melatonin 3 MG TABS tablet Take 3 mg by mouth nightly as needed    Historical Provider, MD   ibuprofen (ADVIL;MOTRIN) 200 MG tablet Take 200 mg by mouth every 6 hours as needed for Pain    Historical Provider, MD       Current medications:    Current Outpatient Medications   Medication Sig Dispense Refill    losartan (COZAAR) 50 MG tablet Take 50 mg by mouth daily      buPROPion (WELLBUTRIN SR) 150 MG extended release tablet       tiZANidine (ZANAFLEX) 4 MG tablet TAKE ONE TABLET BY MOUTH THREE TIMES A DAY AS NEEDED      ketoconazole (NIZORAL) 2 % shampoo ketoconazole 2 % shampoo   APPLY TO THE AFFECTED AREA DAILY/AS NEEDED. ADVISED TO LET SIT 15 MINUTES PRIOR TO WASHING OUT.       melatonin 3 MG TABS tablet Take 3 mg by mouth nightly as needed      ibuprofen (ADVIL;MOTRIN) 200 MG tablet Take 200 mg by mouth every 6 hours as needed for Pain       No current facility-administered medications for this encounter. Allergies: Allergies   Allergen Reactions    Bee Venom Anaphylaxis       Problem List:    Patient Active Problem List   Diagnosis Code    Bilateral shoulder pain M25.511, M25.512    S/P left knee arthroscopy Z98.890    Primary osteoarthritis of left knee M17.12       Past Medical History:        Diagnosis Date    Arthritis     osteoarthritis left knee    Hypertension     Low back pain     herniated disc       Past Surgical History:        Procedure Laterality Date    KNEE ARTHROSCOPY Right 2013    KNEE ARTHROSCOPY Left 09/04/2015    Left knee scope. Lyn Maxim. Christiano Morrow MD    KNEE ARTHROSCOPY Left 8/8/2022    LEFT KNEE ARTHROSCOPY performed by Zuri Cardenas MD at [address] [place] [address]      as child       Social History:    Social History     Tobacco Use    Smoking status: Former     Types: Cigarettes    Smokeless tobacco: Current     Types: Snuff   Substance Use Topics    Alcohol use: Yes     Alcohol/week: 2.0 - 3.0 standard drinks     Types: 2 - 3 Glasses of wine per week     Comment: occasionally                                Ready to quit: Not Answered  Counseling given: Not Answered      Vital Signs (Current):   Vitals:    01/09/23 0832   BP: 134/79   Pulse: 64   Resp: 20   Temp: 98.2 °F (36.8 °C)   TempSrc: Temporal   SpO2: 99%   Weight: 247 lb 6.4 oz (112.2 kg)   Height: 6' 3\" (1.905 m)                                              BP Readings from Last 3 Encounters:   01/09/23 134/79   08/08/22 138/82   10/12/20 122/84       NPO Status:                                                                                 BMI:   Wt Readings from Last 3 Encounters:   01/09/23 247 lb 6.4 oz (112.2 kg)   09/13/22 250 lb (113.4 kg)   08/16/22 250 lb (113.4 kg)     Body mass index is 30.92 kg/m².     CBC:   Lab Results   Component Value Date/Time    WBC 5.4 01/09/2023 08:23 AM    RBC 5.18 01/09/2023 08:23 AM    HGB 15.2 01/09/2023 08:23 AM    HCT 45.1 01/09/2023 08:23 AM    MCV 87.1 01/09/2023 08:23 AM    RDW 13.2 01/09/2023 08:23 AM     01/09/2023 08:23 AM       CMP:   Lab Results   Component Value Date/Time     01/09/2023 08:23 AM    K 4.5 01/09/2023 08:23 AM     01/09/2023 08:23 AM    CO2 24 01/09/2023 08:23 AM    BUN 14 01/09/2023 08:23 AM    CREATININE 1.0 01/09/2023 08:23 AM    LABGLOM >60 01/09/2023 08:23 AM    GLUCOSE 128 01/09/2023 08:23 AM    CALCIUM 9.2 01/09/2023 08:23 AM       POC Tests: No results for input(s): POCGLU, POCNA, POCK, POCCL, POCBUN, POCHEMO, POCHCT in the last 72 hours. Coags: No results found for: PROTIME, INR, APTT    HCG (If Applicable): No results found for: PREGTESTUR, PREGSERUM, HCG, HCGQUANT     ABGs: No results found for: PHART, PO2ART, YRI0CYQ, OOS4NLL, BEART, T5IQHTVF     Type & Screen (If Applicable):  No results found for: LABABO, LABRH    Drug/Infectious Status (If Applicable):  No results found for: HIV, HEPCAB    COVID-19 Screening (If Applicable): No results found for: COVID19        Anesthesia Evaluation  Patient summary reviewed and Nursing notes reviewed no history of anesthetic complications:   Airway: Mallampati: III  TM distance: >3 FB   Neck ROM: full  Mouth opening: > = 3 FB   Dental:          Pulmonary:Negative Pulmonary ROS breath sounds clear to auscultation      (-) not a current smoker                           Cardiovascular:  Exercise tolerance: good (>4 METS),   (+) hypertension:,       ECG reviewed  Rhythm: regular  Rate: normal           Beta Blocker:  Not on Beta Blocker         Neuro/Psych:                ROS comment: Chronic back pain  L sciatica intermittent GI/Hepatic/Renal: Neg GI/Hepatic/Renal ROS            Endo/Other:    (+) : arthritis: OA., .                 Abdominal:             Vascular: negative vascular ROS. Other Findings:           Anesthesia Plan      spinal     ASA 3       Induction: intravenous.       Anesthetic plan and risks discussed with patient and spouse (agrees tospinal and AC block).               Post-op pain plan if not by surgeon: single peripheral nerve block            Ana Dove MD   1/9/2023

## 2023-01-09 NOTE — PROGRESS NOTES
I met with Hilda Mendoza this am for an orthopaedic education class. We discussed information regarding pre, intra, post-op, discharge, and LOS expectations. I provided ortho education materials. I encouraged the patient to call with any questions or concerns.

## 2023-01-10 LAB — MRSA CULTURE ONLY: NORMAL

## 2023-01-13 ENCOUNTER — HOSPITAL ENCOUNTER (OUTPATIENT)
Age: 53
Discharge: HOME | End: 2023-01-13
Attending: ORTHOPAEDIC SURGERY | Admitting: ORTHOPAEDIC SURGERY
Payer: COMMERCIAL

## 2023-01-13 ENCOUNTER — ANESTHESIA (OUTPATIENT)
Dept: OPERATING ROOM | Age: 53
End: 2023-01-13
Payer: COMMERCIAL

## 2023-01-13 VITALS
HEART RATE: 59 BPM | BODY MASS INDEX: 30.71 KG/M2 | HEIGHT: 75 IN | RESPIRATION RATE: 16 BRPM | WEIGHT: 247 LBS | DIASTOLIC BLOOD PRESSURE: 78 MMHG | OXYGEN SATURATION: 96 % | SYSTOLIC BLOOD PRESSURE: 113 MMHG | TEMPERATURE: 97.4 F

## 2023-01-13 DIAGNOSIS — M17.12 OSTEOARTHRITIS OF LEFT KNEE, UNSPECIFIED OSTEOARTHRITIS TYPE: ICD-10-CM

## 2023-01-13 DIAGNOSIS — M17.12 PRIMARY OSTEOARTHRITIS OF LEFT KNEE: Primary | ICD-10-CM

## 2023-01-13 PROCEDURE — 97165 OT EVAL LOW COMPLEX 30 MIN: CPT

## 2023-01-13 PROCEDURE — 2709999900 HC NON-CHARGEABLE SUPPLY: Performed by: ORTHOPAEDIC SURGERY

## 2023-01-13 PROCEDURE — 2580000003 HC RX 258: Performed by: ORTHOPAEDIC SURGERY

## 2023-01-13 PROCEDURE — 6370000000 HC RX 637 (ALT 250 FOR IP): Performed by: PHYSICIAN ASSISTANT

## 2023-01-13 PROCEDURE — 3700000001 HC ADD 15 MINUTES (ANESTHESIA): Performed by: ORTHOPAEDIC SURGERY

## 2023-01-13 PROCEDURE — 6370000000 HC RX 637 (ALT 250 FOR IP): Performed by: ORTHOPAEDIC SURGERY

## 2023-01-13 PROCEDURE — 6360000002 HC RX W HCPCS

## 2023-01-13 PROCEDURE — 2500000003 HC RX 250 WO HCPCS: Performed by: ORTHOPAEDIC SURGERY

## 2023-01-13 PROCEDURE — 97116 GAIT TRAINING THERAPY: CPT

## 2023-01-13 PROCEDURE — 6360000002 HC RX W HCPCS: Performed by: ANESTHESIOLOGY

## 2023-01-13 PROCEDURE — 2500000003 HC RX 250 WO HCPCS: Performed by: ANESTHESIOLOGY

## 2023-01-13 PROCEDURE — 7100000001 HC PACU RECOVERY - ADDTL 15 MIN: Performed by: ORTHOPAEDIC SURGERY

## 2023-01-13 PROCEDURE — 2500000003 HC RX 250 WO HCPCS: Performed by: PHYSICIAN ASSISTANT

## 2023-01-13 PROCEDURE — 2500000003 HC RX 250 WO HCPCS

## 2023-01-13 PROCEDURE — 2720000010 HC SURG SUPPLY STERILE: Performed by: ORTHOPAEDIC SURGERY

## 2023-01-13 PROCEDURE — 6360000002 HC RX W HCPCS: Performed by: ORTHOPAEDIC SURGERY

## 2023-01-13 PROCEDURE — C1713 ANCHOR/SCREW BN/BN,TIS/BN: HCPCS | Performed by: ORTHOPAEDIC SURGERY

## 2023-01-13 PROCEDURE — 3600000005 HC SURGERY LEVEL 5 BASE: Performed by: ORTHOPAEDIC SURGERY

## 2023-01-13 PROCEDURE — 6360000002 HC RX W HCPCS: Performed by: PHYSICIAN ASSISTANT

## 2023-01-13 PROCEDURE — 97110 THERAPEUTIC EXERCISES: CPT

## 2023-01-13 PROCEDURE — 97530 THERAPEUTIC ACTIVITIES: CPT

## 2023-01-13 PROCEDURE — 3600000015 HC SURGERY LEVEL 5 ADDTL 15MIN: Performed by: ORTHOPAEDIC SURGERY

## 2023-01-13 PROCEDURE — 7100000000 HC PACU RECOVERY - FIRST 15 MIN: Performed by: ORTHOPAEDIC SURGERY

## 2023-01-13 PROCEDURE — 64447 NJX AA&/STRD FEMORAL NRV IMG: CPT | Performed by: ANESTHESIOLOGY

## 2023-01-13 PROCEDURE — 2580000003 HC RX 258: Performed by: PHYSICIAN ASSISTANT

## 2023-01-13 PROCEDURE — 3700000000 HC ANESTHESIA ATTENDED CARE: Performed by: ORTHOPAEDIC SURGERY

## 2023-01-13 PROCEDURE — 2580000003 HC RX 258

## 2023-01-13 PROCEDURE — 97161 PT EVAL LOW COMPLEX 20 MIN: CPT

## 2023-01-13 PROCEDURE — C1776 JOINT DEVICE (IMPLANTABLE): HCPCS | Performed by: ORTHOPAEDIC SURGERY

## 2023-01-13 PROCEDURE — 88305 TISSUE EXAM BY PATHOLOGIST: CPT

## 2023-01-13 PROCEDURE — 88311 DECALCIFY TISSUE: CPT

## 2023-01-13 DEVICE — TIBIAL COMPONENT
Type: IMPLANTABLE DEVICE | Site: KNEE | Status: FUNCTIONAL
Brand: TRIATHLON

## 2023-01-13 DEVICE — PATELLA
Type: IMPLANTABLE DEVICE | Site: KNEE | Status: FUNCTIONAL
Brand: TRIATHLON

## 2023-01-13 DEVICE — CRUCIATE RETAINING FEMORAL
Type: IMPLANTABLE DEVICE | Site: KNEE | Status: FUNCTIONAL
Brand: TRIATHLON

## 2023-01-13 DEVICE — TIBIAL BEARING INSERT - CR
Type: IMPLANTABLE DEVICE | Site: KNEE | Status: FUNCTIONAL
Brand: TRIATHLON

## 2023-01-13 RX ORDER — SODIUM CHLORIDE 9 MG/ML
INJECTION, SOLUTION INTRAVENOUS CONTINUOUS
Status: DISCONTINUED | OUTPATIENT
Start: 2023-01-13 | End: 2023-01-13 | Stop reason: SDUPTHER

## 2023-01-13 RX ORDER — ONDANSETRON 4 MG/1
4 TABLET, ORALLY DISINTEGRATING ORAL EVERY 8 HOURS PRN
Status: DISCONTINUED | OUTPATIENT
Start: 2023-01-13 | End: 2023-01-13 | Stop reason: HOSPADM

## 2023-01-13 RX ORDER — ACETAMINOPHEN 500 MG
1000 TABLET ORAL EVERY 8 HOURS
Qty: 180 TABLET | Refills: 0
Start: 2023-01-13

## 2023-01-13 RX ORDER — OXYCODONE HYDROCHLORIDE 5 MG/1
10 TABLET ORAL EVERY 4 HOURS PRN
Status: DISCONTINUED | OUTPATIENT
Start: 2023-01-13 | End: 2023-01-13 | Stop reason: HOSPADM

## 2023-01-13 RX ORDER — ONDANSETRON 2 MG/ML
4 INJECTION INTRAMUSCULAR; INTRAVENOUS EVERY 6 HOURS PRN
Status: DISCONTINUED | OUTPATIENT
Start: 2023-01-13 | End: 2023-01-13 | Stop reason: HOSPADM

## 2023-01-13 RX ORDER — SODIUM CHLORIDE 0.9 % (FLUSH) 0.9 %
5-40 SYRINGE (ML) INJECTION EVERY 12 HOURS SCHEDULED
Status: DISCONTINUED | OUTPATIENT
Start: 2023-01-13 | End: 2023-01-13 | Stop reason: HOSPADM

## 2023-01-13 RX ORDER — SODIUM CHLORIDE 0.9 % (FLUSH) 0.9 %
5-40 SYRINGE (ML) INJECTION PRN
Status: DISCONTINUED | OUTPATIENT
Start: 2023-01-13 | End: 2023-01-13 | Stop reason: HOSPADM

## 2023-01-13 RX ORDER — ACETAMINOPHEN 500 MG
1000 TABLET ORAL ONCE
Status: COMPLETED | OUTPATIENT
Start: 2023-01-13 | End: 2023-01-13

## 2023-01-13 RX ORDER — SODIUM CHLORIDE 9 MG/ML
INJECTION, SOLUTION INTRAVENOUS PRN
Status: DISCONTINUED | OUTPATIENT
Start: 2023-01-13 | End: 2023-01-13 | Stop reason: HOSPADM

## 2023-01-13 RX ORDER — ACETAMINOPHEN 500 MG
1000 TABLET ORAL EVERY 8 HOURS SCHEDULED
Status: DISCONTINUED | OUTPATIENT
Start: 2023-01-13 | End: 2023-01-13 | Stop reason: HOSPADM

## 2023-01-13 RX ORDER — EPHEDRINE SULFATE/0.9% NACL/PF 50 MG/5 ML
SYRINGE (ML) INTRAVENOUS PRN
Status: DISCONTINUED | OUTPATIENT
Start: 2023-01-13 | End: 2023-01-13 | Stop reason: SDUPTHER

## 2023-01-13 RX ORDER — FENTANYL CITRATE 50 UG/ML
INJECTION, SOLUTION INTRAMUSCULAR; INTRAVENOUS PRN
Status: DISCONTINUED | OUTPATIENT
Start: 2023-01-13 | End: 2023-01-13 | Stop reason: SDUPTHER

## 2023-01-13 RX ORDER — CELECOXIB 100 MG/1
200 CAPSULE ORAL ONCE
Status: COMPLETED | OUTPATIENT
Start: 2023-01-13 | End: 2023-01-13

## 2023-01-13 RX ORDER — OXYCODONE HYDROCHLORIDE 5 MG/1
5 TABLET ORAL EVERY 4 HOURS PRN
Status: DISCONTINUED | OUTPATIENT
Start: 2023-01-13 | End: 2023-01-13 | Stop reason: HOSPADM

## 2023-01-13 RX ORDER — PROPOFOL 10 MG/ML
INJECTION, EMULSION INTRAVENOUS PRN
Status: DISCONTINUED | OUTPATIENT
Start: 2023-01-13 | End: 2023-01-13 | Stop reason: SDUPTHER

## 2023-01-13 RX ORDER — ASPIRIN 325 MG
325 TABLET, DELAYED RELEASE (ENTERIC COATED) ORAL DAILY
Qty: 28 TABLET | Refills: 0
Start: 2023-01-13 | End: 2023-02-10

## 2023-01-13 RX ORDER — MIDAZOLAM HYDROCHLORIDE 2 MG/2ML
0.5 INJECTION, SOLUTION INTRAMUSCULAR; INTRAVENOUS PRN
Status: DISCONTINUED | OUTPATIENT
Start: 2023-01-13 | End: 2023-01-13 | Stop reason: HOSPADM

## 2023-01-13 RX ORDER — PHENYLEPHRINE HCL IN 0.9% NACL 1 MG/10 ML
SYRINGE (ML) INTRAVENOUS PRN
Status: DISCONTINUED | OUTPATIENT
Start: 2023-01-13 | End: 2023-01-13 | Stop reason: SDUPTHER

## 2023-01-13 RX ORDER — SODIUM CHLORIDE 9 MG/ML
INJECTION, SOLUTION INTRAVENOUS CONTINUOUS PRN
Status: DISCONTINUED | OUTPATIENT
Start: 2023-01-13 | End: 2023-01-13 | Stop reason: SDUPTHER

## 2023-01-13 RX ORDER — BUPIVACAINE HYDROCHLORIDE 7.5 MG/ML
INJECTION, SOLUTION INTRASPINAL PRN
Status: DISCONTINUED | OUTPATIENT
Start: 2023-01-13 | End: 2023-01-13 | Stop reason: SDUPTHER

## 2023-01-13 RX ORDER — FENTANYL CITRATE 50 UG/ML
25 INJECTION, SOLUTION INTRAMUSCULAR; INTRAVENOUS EVERY 5 MIN PRN
Status: DISCONTINUED | OUTPATIENT
Start: 2023-01-13 | End: 2023-01-13 | Stop reason: HOSPADM

## 2023-01-13 RX ORDER — DEXAMETHASONE SODIUM PHOSPHATE 4 MG/ML
INJECTION, SOLUTION INTRA-ARTICULAR; INTRALESIONAL; INTRAMUSCULAR; INTRAVENOUS; SOFT TISSUE PRN
Status: DISCONTINUED | OUTPATIENT
Start: 2023-01-13 | End: 2023-01-13 | Stop reason: SDUPTHER

## 2023-01-13 RX ORDER — ROPIVACAINE HYDROCHLORIDE 5 MG/ML
INJECTION, SOLUTION EPIDURAL; INFILTRATION; PERINEURAL
Status: COMPLETED | OUTPATIENT
Start: 2023-01-13 | End: 2023-01-13

## 2023-01-13 RX ORDER — ONDANSETRON 2 MG/ML
INJECTION INTRAMUSCULAR; INTRAVENOUS PRN
Status: DISCONTINUED | OUTPATIENT
Start: 2023-01-13 | End: 2023-01-13 | Stop reason: SDUPTHER

## 2023-01-13 RX ORDER — OXYCODONE HYDROCHLORIDE 5 MG/1
5-10 TABLET ORAL EVERY 4 HOURS PRN
Qty: 70 TABLET | Refills: 0 | Status: SHIPPED | OUTPATIENT
Start: 2023-01-13 | End: 2023-01-20

## 2023-01-13 RX ORDER — LIDOCAINE HYDROCHLORIDE 10 MG/ML
10 INJECTION, SOLUTION INFILTRATION; PERINEURAL
Status: COMPLETED | OUTPATIENT
Start: 2023-01-13 | End: 2023-01-13

## 2023-01-13 RX ORDER — SODIUM CHLORIDE 9 MG/ML
INJECTION, SOLUTION INTRAVENOUS CONTINUOUS
Status: DISCONTINUED | OUTPATIENT
Start: 2023-01-13 | End: 2023-01-13 | Stop reason: HOSPADM

## 2023-01-13 RX ORDER — OXYCODONE HYDROCHLORIDE 10 MG/1
5-10 TABLET ORAL EVERY 4 HOURS PRN
Qty: 42 TABLET | Refills: 0 | Status: SHIPPED | OUTPATIENT
Start: 2023-01-13 | End: 2023-01-20

## 2023-01-13 RX ORDER — PROCHLORPERAZINE EDISYLATE 5 MG/ML
5 INJECTION INTRAMUSCULAR; INTRAVENOUS
Status: DISCONTINUED | OUTPATIENT
Start: 2023-01-13 | End: 2023-01-13 | Stop reason: HOSPADM

## 2023-01-13 RX ORDER — DIPHENHYDRAMINE HYDROCHLORIDE 50 MG/ML
12.5 INJECTION INTRAMUSCULAR; INTRAVENOUS
Status: DISCONTINUED | OUTPATIENT
Start: 2023-01-13 | End: 2023-01-13 | Stop reason: HOSPADM

## 2023-01-13 RX ORDER — ROPIVACAINE HYDROCHLORIDE 5 MG/ML
30 INJECTION, SOLUTION EPIDURAL; INFILTRATION; PERINEURAL
Status: COMPLETED | OUTPATIENT
Start: 2023-01-13 | End: 2023-01-13

## 2023-01-13 RX ORDER — FENTANYL CITRATE 50 UG/ML
25 INJECTION, SOLUTION INTRAMUSCULAR; INTRAVENOUS PRN
Status: DISCONTINUED | OUTPATIENT
Start: 2023-01-13 | End: 2023-01-13 | Stop reason: HOSPADM

## 2023-01-13 RX ORDER — SODIUM CHLORIDE 9 MG/ML
25 INJECTION, SOLUTION INTRAVENOUS PRN
Status: DISCONTINUED | OUTPATIENT
Start: 2023-01-13 | End: 2023-01-13 | Stop reason: HOSPADM

## 2023-01-13 RX ORDER — MEPERIDINE HYDROCHLORIDE 25 MG/ML
12.5 INJECTION INTRAMUSCULAR; INTRAVENOUS; SUBCUTANEOUS EVERY 10 MIN PRN
Status: DISCONTINUED | OUTPATIENT
Start: 2023-01-13 | End: 2023-01-13 | Stop reason: HOSPADM

## 2023-01-13 RX ORDER — CEPHALEXIN 500 MG/1
500 CAPSULE ORAL 3 TIMES DAILY
Qty: 9 CAPSULE | Refills: 0 | Status: SHIPPED | OUTPATIENT
Start: 2023-01-13 | End: 2023-01-16

## 2023-01-13 RX ORDER — DEXAMETHASONE SODIUM PHOSPHATE 10 MG/ML
8 INJECTION, SOLUTION INTRAMUSCULAR; INTRAVENOUS ONCE
Status: DISCONTINUED | OUTPATIENT
Start: 2023-01-13 | End: 2023-01-13 | Stop reason: HOSPADM

## 2023-01-13 RX ADMIN — Medication 100 MCG: at 07:40

## 2023-01-13 RX ADMIN — FENTANYL CITRATE 25 MCG: 50 INJECTION, SOLUTION INTRAMUSCULAR; INTRAVENOUS at 07:04

## 2023-01-13 RX ADMIN — ACETAMINOPHEN 1000 MG: 500 TABLET ORAL at 05:45

## 2023-01-13 RX ADMIN — ONDANSETRON 4 MG: 2 INJECTION INTRAMUSCULAR; INTRAVENOUS at 09:42

## 2023-01-13 RX ADMIN — Medication 100 MCG: at 07:15

## 2023-01-13 RX ADMIN — Medication 100 MCG: at 07:24

## 2023-01-13 RX ADMIN — LIDOCAINE HYDROCHLORIDE 5 ML: 10 INJECTION, SOLUTION INFILTRATION; PERINEURAL at 06:29

## 2023-01-13 RX ADMIN — MIDAZOLAM 1.5 MG: 1 INJECTION INTRAMUSCULAR; INTRAVENOUS at 06:20

## 2023-01-13 RX ADMIN — PROPOFOL 40 MG: 10 INJECTION, EMULSION INTRAVENOUS at 07:05

## 2023-01-13 RX ADMIN — Medication 100 MCG: at 06:44

## 2023-01-13 RX ADMIN — CELECOXIB 200 MG: 100 CAPSULE ORAL at 05:45

## 2023-01-13 RX ADMIN — Medication 100 MCG: at 07:10

## 2023-01-13 RX ADMIN — BUPIVACAINE HYDROCHLORIDE IN DEXTROSE 2 ML: 7.5 INJECTION, SOLUTION SUBARACHNOID at 07:04

## 2023-01-13 RX ADMIN — OXYCODONE HYDROCHLORIDE 10 MG: 5 TABLET ORAL at 15:21

## 2023-01-13 RX ADMIN — SODIUM CHLORIDE: 9 INJECTION, SOLUTION INTRAVENOUS at 08:40

## 2023-01-13 RX ADMIN — SODIUM CHLORIDE: 9 INJECTION, SOLUTION INTRAVENOUS at 06:53

## 2023-01-13 RX ADMIN — Medication 100 MCG: at 07:32

## 2023-01-13 RX ADMIN — TRANEXAMIC ACID 1000 MG: 1 INJECTION, SOLUTION INTRAVENOUS at 10:10

## 2023-01-13 RX ADMIN — SODIUM CHLORIDE: 9 INJECTION, SOLUTION INTRAVENOUS at 07:20

## 2023-01-13 RX ADMIN — Medication 10 MG: at 08:08

## 2023-01-13 RX ADMIN — ROPIVACAINE HYDROCHLORIDE 20 ML: 5 INJECTION, SOLUTION EPIDURAL; INFILTRATION; PERINEURAL at 06:35

## 2023-01-13 RX ADMIN — WATER 2000 MG: 1 INJECTION INTRAMUSCULAR; INTRAVENOUS; SUBCUTANEOUS at 07:00

## 2023-01-13 RX ADMIN — TRANEXAMIC ACID 1000 MG: 1 INJECTION, SOLUTION INTRAVENOUS at 07:10

## 2023-01-13 RX ADMIN — Medication 10 MG: at 07:50

## 2023-01-13 RX ADMIN — Medication 100 MCG: at 07:35

## 2023-01-13 RX ADMIN — DEXAMETHASONE SODIUM PHOSPHATE 10 MG: 4 INJECTION, SOLUTION INTRAMUSCULAR; INTRAVENOUS at 07:06

## 2023-01-13 RX ADMIN — WATER 2000 MG: 1 INJECTION INTRAMUSCULAR; INTRAVENOUS; SUBCUTANEOUS at 13:56

## 2023-01-13 RX ADMIN — FENTANYL CITRATE 75 MCG: 50 INJECTION, SOLUTION INTRAMUSCULAR; INTRAVENOUS at 06:20

## 2023-01-13 RX ADMIN — PROPOFOL 75 MCG/KG/MIN: 10 INJECTION, EMULSION INTRAVENOUS at 07:06

## 2023-01-13 RX ADMIN — Medication 200 MCG: at 07:44

## 2023-01-13 RX ADMIN — Medication 100 MCG: at 07:05

## 2023-01-13 ASSESSMENT — PAIN DESCRIPTION - ORIENTATION
ORIENTATION: LEFT

## 2023-01-13 ASSESSMENT — PAIN DESCRIPTION - DESCRIPTORS
DESCRIPTORS: DISCOMFORT
DESCRIPTORS: ACHING;DISCOMFORT
DESCRIPTORS: DISCOMFORT

## 2023-01-13 ASSESSMENT — PAIN SCALES - GENERAL
PAINLEVEL_OUTOF10: 0
PAINLEVEL_OUTOF10: 3
PAINLEVEL_OUTOF10: 3
PAINLEVEL_OUTOF10: 7
PAINLEVEL_OUTOF10: 3
PAINLEVEL_OUTOF10: 3
PAINLEVEL_OUTOF10: 0
PAINLEVEL_OUTOF10: 0

## 2023-01-13 ASSESSMENT — PAIN DESCRIPTION - LOCATION
LOCATION: KNEE

## 2023-01-13 ASSESSMENT — PAIN - FUNCTIONAL ASSESSMENT
PAIN_FUNCTIONAL_ASSESSMENT: ACTIVITIES ARE NOT PREVENTED
PAIN_FUNCTIONAL_ASSESSMENT: 0-10

## 2023-01-13 NOTE — OP NOTE
Operative Note      Patient: Samanta Bush  YOB: 1970  MRN: [id number]    Date of Procedure: 1/13/2023    Pre-Op Diagnosis: Osteoarthritis of left knee, unspecified osteoarthritis type [M17.12]    Post-Op Diagnosis: Same       Procedure(s):  LEFT KNEE JENNIFER ROBOTIC ASSISTED TOTAL ARTHROPLASTY PNB    Surgeon(s):  Charanjit Haque MD    Assistant:   Blanca Garcia PA-C    Anesthesia: Spinal    Estimated Blood Loss (mL): less than 50     Complications: None    Specimens:   ID Type Source Tests Collected by Time Destination   A : BONE LEFT KNEE Bone Joint, Knee SURGICAL PATHOLOGY Charanjit Haque MD 1/13/2023 8935        Implants:  Implant Name Type Inv. Item Serial No.  Lot No. LRB No. Used Action   INSERT TIB CR 6 13 MM KNEE 5/PK X3 TRIATHLON - [id number]  INSERT TIB CR 6 13 MM KNEE 5/PK X3 TRIATHLON  [employer] [id number] Left 1 Implanted   COMPONENT FEM SZ 6 L KNEE CRUCE RET CEMENTLESS BEAD W/ MISAEL - [id number]  COMPONENT FEM SZ 6 L KNEE CRUCE RET CEMENTLESS BEAD W/ MISAEL  [employer] EX[employer]-WD [id number] Left 1 Implanted   BASEPLATE TIB SZ 6 [id number] ML77MM KNEE TRITANIUM 4 CRUCFRM - [id number]  BASEPLATE TIB SZ 6 [id number] ML77MM KNEE TRITANIUM 4 CRUCFRM  [employer] EX[employer]- [id number] Left 1 Implanted   COMPONENT PAT HAV84CE [id number] SUPERIOR/INFERIOR KNEE - [id number]  COMPONENT PAT [id number] SUPERIOR/INFERIOR KNEE  [employer]- [id number] Left 1 Implanted         Drains: * No LDAs found *    Findings: OA    Detailed Description of Procedure:                                                                                      HISTORY:  The patient is an 45y.o. -year-old male  with progressive pain in the Left knee. X-rays and evaluation revealed significant osteoarthritis, bone on bone, and significant peripheral osteophytes.   The patient had tried or considered all conservative options applicable, including injections, anti-inflammatories, bracing, and exercise, without success. Having failed conservative options, it was felt the patient would benefit from total knee arthroplasty. The patient was cleared medically and came to the OR on 1/13/2023 . Due to age, we chose to use a cruciate retaining implant, press-fit. PROCEDURE:  The patient underwent placement of peripheral nerve block in the holding area, and then came to the operating room where the patient underwent  spinal anesthesia. The patient was positioned supine on the operative table. Tourniquet was applied around the Left thigh proximally. The Left lower extremity was prepped and draped in the usual manner. We inflated tourniquet to 250 mmHg. We made a standard midline incision for a medial parapatellar approach to the knee. We carried incision through the skin and subcutaneous fat to the layer of the joint capsule. We made a medial parapatellar arthrotomy, which we extended proximally in the mid vastus-type approach. We took down our proximal medial tibial tissue in a subperiosteal manner. We everted the patella, made our patellar cut. We sized the patella to a 32 mm patellar component and drilled for the lugs. We applied patellar protecting plate. We flexed the knee to sublux the patella laterally. We excised the menisci and the anterior cruciate ligament. At this point we applied the instrumentation required for the [facility] robot. The femoral and tibial reference points were inserted. The femoral and tibial arrays were also applied. The femoral array was applied within the incision proximally. The tibial array was applied through a separate incision approximately 2 handbreadth below the tibial tuberosity. We then performed the appropriate joint surface registration steps for the procedure. This included finding the center of rotation of the femoral head and mapping the service of the femur and tibia. Our next step was to perform intraoperative soft tissue assessments.   This was done and data was recorded. We then adjusted our bone cuts appropriately based on soft tissue tension. Once all appropriate data have been obtained and evaluated and we were happy with our plan, we proceeded with the bone cuts for the knee replacement. We first did our proximal tibial cut followed by our distal femoral cut. We checked to be sure we had good balance in extension. We then checked our flexion gap and soft tissue tension. We made our posterior condylar cut. We found that we had good ligament balance in flexion and extension to accommodate a 13 mm polyethylene component. The we then made the remainder of our cuts including the anterior and posterior chamfers and the anterior femoral cut. We checked sizing for the tibial component. We applied the trial femur and inserted the trial tibia with the 13 mm poly trial.  We set rotation for the tibial component and secured the tibial trial.  We then had excellent stability through a  full range of motion, and excellent patella tracking with a 32 mm patella trial in place. We removed the femoral trial and prepared for the tibial keel. We therefore chose this combination of implants. We irrigated all bone cut surfaces. We inserted our components, starting with a size 6 tibia, followed by a size 6 Left cruciate retaining femur. We put in a 13 mm CR poly and put the knee in extension to get full compression of our implants. We inserted a 32 mm patella. We applied the clamp, removed the clamp and copiously irrigated the knee. We closed the deep and superficial layers of the vastus medialis using 0 Vicryl suture. We closed the capsule with #1 Stratofix running suture. We closed the subcutaneous layer with 2-0 Vicryl inverted suture and the skin with a running 3-0 Monocryl subcuticular stitch. Steri-Strips were applied to the skin. Sterile dressings applied with Aquacel.   Tourniquet was let down at approximately 100 minutes of tourniquet time.  The patient was retrieved from anesthesia and taken to recovery room in good condition, having tolerated the procedure well. All needle, sponge and instrument counts were correct. SUMMARY OF IMPLANTS USED:  Jimmy Triathlon knee system with a size 6 Left cruciate retaining femur, size 6  tibia, 13 mm CR poly, and a 32 mm patella. All implants were non-cemented. SPECIMENS:  Bone Cuts      Serjio Winter PA-C, was present for the entire procedure, assisting in positioning & draping, each step of the procedure, and wound closure.         Mariano Snow MD          Electronically signed by Mariano Snow MD on 1/13/2023 at 9:12 AM

## 2023-01-13 NOTE — DISCHARGE INSTRUCTIONS
Orthopaedic Discharge Instructions:    1)  Home care as arranged    2)  ECASA 325 mg daily for 4 weeks after surgery    3)  Durable Medical Equipment (DME) as needed    4)  Home Physical Therapy    5)  May shower at home with Aquacel dressing in place. 6)  Wear compression stockings during the day. Okay to remove at bedtime. 7)  Elevate legs as needed to manage swelling. 8)  Remove Aquacel dressing post op day 7. Leave incision open to air.

## 2023-01-13 NOTE — ANESTHESIA PROCEDURE NOTES
Peripheral Block    Patient location during procedure: procedure area  Reason for block: procedure for pain, post-op pain management and at surgeon's request  Start time: 1/13/2023 6:28 AM  End time: 1/13/2023 6:35 AM  Staffing  Performed: other anesthesia staff   Anesthesiologist: Sara Nash MD  Other anesthesia staff:  Margot Higgins RN  Preanesthetic Checklist  Completed: patient identified, IV checked, site marked, risks and benefits discussed, surgical/procedural consents, equipment checked, pre-op evaluation, timeout performed, anesthesia consent given, oxygen available, monitors applied/VS acknowledged, fire risk safety assessment completed and verbalized and blood product R/B/A discussed and consented  Peripheral Block   Patient position: supine (low fowlers)  Prep: ChloraPrep and site prepped and draped  Provider prep: mask and sterile gloves  Patient monitoring: cardiac monitor, continuous pulse ox, continuous capnometry, frequent blood pressure checks, IV access, responsive to questions and oxygen  Block type: Femoral  Adductor canal  Laterality: left  Injection technique: single-shot  Guidance: ultrasound guided  Local infiltration: lidocaine  Infiltration strength: 1 %  Local infiltration: lidocaine  Dose: 5 mL    Needle   Needle type: insulated echogenic nerve stimulator needle   Needle gauge: 22 G  Needle localization: ultrasound guidance  Needle length: 10 cm  Assessment   Injection assessment: negative aspiration for heme, no paresthesia on injection, local visualized surrounding nerve on ultrasound and no intravascular symptoms  Paresthesia pain: none  Slow fractionated injection: yes  Hemodynamics: stable  Real-time US image taken/store: yes  Outcomes: patient tolerated procedure well and uncomplicated    Medications Administered  ropivacaine (NAROPIN) injection 0.5% - Perineural   20 mL - 1/13/2023 6:35:00 AM

## 2023-01-13 NOTE — PROGRESS NOTES
Occupational Therapy    OCCUPATIONAL THERAPY INITIAL EVALUATION     [address] [place], [place]         [id number]                                                  Patient Name: Alirio Zacarias    MRN: [id number]    : 1970    Room: [address] [place] [address]      Evaluating OT: Toya Hogan OTR/L   [id number]      Referring Laura Caballero MD    Specific Provider Orders/Date:OT eval and treat 2023      Diagnosis:  Osteoarthritis of left knee, unspecified osteoarthritis type [M17.12]  Primary osteoarthritis of left knee [M17.12]     L TKA 2023     Pertinent Medical History: low back pain      Precautions:  Fall Risk, WBAT LE        Home Living: Pt lives with wife,  1 story eith 3+1 steps to enter   bed/bath on 2nd   Bathroom setup: [address]    Equipment owned: walker, cane     Prior Level of Function: Independent  with ADLs , Independent  with IADLs; ambulated with no device   Driving: yes   Occupation:      Pain Level: no pain ;   Cognition: A&O: 4/4;    Memory:  good    Sequencing:  good    Problem solving:  good    Judgement/safety:  good      Functional Assessment:  AM-PAC Daily Activity Raw Score: 23/24   Initial Eval Status  Date: 2023   Feeding Independent    Grooming Independent    UB Dressing Independent    LB Dressing Min A  Able to don pants, socks and shoes   Edcuated on dressing tech and blue compression stocking sleeve   Bathing SBA    Toileting Independent    Bed Mobility  Up in bathroom upon entry   Functional Transfers Independent   Sit- stand from chair    Functional Mobility Mod I, w/walker   Ambulating in room and hallway    Balance Sitting:     Static:  Independent     Dynamic:independent   Standing: independent    Activity Tolerance Good    Visual/  Perceptual Glasses: none by bedside            Hand Dominance right   AROM (PROM) Strength Additional Info:    RUE  WFL WFL good  and wfl FMC/dexterity noted during ADL tasks       [facility] good  and wfl FMC/dexterity noted during ADL tasks       Hearing: [facility]   Sensation:  No c/o numbness or tingling  Tone: WFL   Edema: none observed     Comments: Upon arrival patient in bathroom . At end of session, patient sititng in chair  with call light and phone within reach, all lines and tubes intact. Patient / Family Goal: return home       . Eval Complexity: Low    Time In: 1410  Time Out: 1425      Min Units   OT Eval Low 97165 x  1   OT Eval Medium 54015      OT Eval High 26641      OT Re-Eval [id number]       Therapeutic Ex 46980      Therapeutic Activities 06405       ADL/Self Care 76779       Orthotic Management 97759       Manual 17069     Neuro Re-Ed 61035       Non-Billable Time          Evaluation Time additionally includes thorough review of current medical information, gathering information on past medical history/social history and prior level of function, interpretation of standardized testing/informal observation of tasks, assessment of data and development of plan of care and goals.             Lo[place]  OTR/L  OT 200624

## 2023-01-13 NOTE — PROGRESS NOTES
CLINICAL PHARMACY NOTE: MEDS TO BEDS    Total # of Prescriptions Filled: 2   The following medications were delivered to the patient:  Cephalexin 500 mg  Oxycodone 5 mg    Additional Documentation:

## 2023-01-13 NOTE — ANESTHESIA POSTPROCEDURE EVALUATION
Department of Anesthesiology  Postprocedure Note    Patient: Yuri Marte  MRN: [id number]  YOB: 1970  Date of evaluation: 1/13/2023      Procedure Summary     Date: 01/13/23 Room / Location: [facility] OR 05 / [facility]    Anesthesia Start: 7627 Anesthesia Stop: 7388    Procedure: LEFT KNEE JENNIFER ROBOTIC ASSISTED TOTAL ARTHROPLASTY PNB (Left: Knee) Diagnosis:       Osteoarthritis of left knee, unspecified osteoarthritis type      (Osteoarthritis of left knee, unspecified osteoarthritis type [M17.12])    Surgeons: Jeremías Bah MD Responsible Provider: Barbara Jimenez MD    Anesthesia Type: Spinal ASA Status: 3          Anesthesia Type: Spinal    Fior Phase I: Fior Score: 10    Fior Phase II:        Anesthesia Post Evaluation    Patient location during evaluation: PACU  Patient participation: complete - patient participated  Level of consciousness: awake and alert  Airway patency: patent  Nausea & Vomiting: no vomiting and no nausea  Complications: no  Cardiovascular status: blood pressure returned to baseline  Respiratory status: acceptable  Hydration status: euvolemic  Multimodal analgesia pain management approach

## 2023-01-13 NOTE — PROGRESS NOTES
Physical Therapy  Facility/Department: [facility] MED SURG  Physical Therapy Initial Assessment    Name: Cleve Emmanuel  : 1970  MRN: [id number]  Date of Service: 2023    Attending Provider:  Sylvie Bernheim, MD    Evaluating PT:  Sandip Irving P.T. Room #:  7206/0722-V  Diagnosis:  Osteoarthritis of left knee, unspecified osteoarthritis type [M17.12]  Primary osteoarthritis of left knee [M17.12]  Procedure/Surgery:  23 L TKA  Precautions:  WBAT LLE    SUBJECTIVE:    Pt lives with his wife in a 2 story home with 3+1 stairs and 2 rails to enter. There are 12 steps and 1 rail to 2nd floor bed and bath. Pt ambulated with no AD PTA. He has crutches, cane, and ww    OBJECTIVE:   Initial Evaluation  Date: 23 Treatment Short Term/ Long Term   Goals   Was pt agreeable to Eval/treatment? yes     Does pt have pain? Mild L knee pain     Bed Mobility  Rolling: Independent  Supine to sit: Independent  Sit to supine: Independent  Scooting: Independent  Independent   Transfers Sit to stand: SBA  Stand to sit: SBA  Stand pivot: SBA with ww  Independent   Ambulation   15 feet x 2 reps and 250 feet with ww and SBA  350 feet with ww Independent    Stair negotiation: ascended and descended 4 steps with 2 rails SBA and 4 steps with 1 rail and a cane SBA  12 steps with 1 rail and cane Independent    AM-PAC 6 Clicks 79/33       Pt is A & O x 4  BLE ROM is WFL, except L knee is 0-90°. RLE strength is grossly 5/5 and LLE is grossly 3-/5 to 4/5. Balance: sitting is Independent and standing with ww is SBA  Endurance: fair+    Therapeutic Exercises:  Pt was instructed in/performed supine exercises with the LLE: ankle PF/DF with bed sheet 15 reps, heel slides and quad sets AAROM with bed sheet 15 reps. Patient education  Pt educated on above exs as HEP, hand placement during transfers, gait with ww, and stair negotiation sequence.      Patient response to education:   Pt verbalized understanding Pt demonstrated skill Pt requires further education in this area   yes yes yes     ASSESSMENT:    Conditions Requiring Skilled Therapeutic Intervention:    [x]Decreased strength     [x]Decreased ROM  [x]Decreased functional mobility  [x]Decreased balance   [x]Decreased endurance   []Decreased posture  []Decreased sensation  []Decreased coordination   []Decreased vision  []Decreased safety awareness   [x]Increased pain       Comments:  Pt was found supine in bed and was asking to use BR. He states that he was able to feel and move BLE near normal since nerve block from surgery. He walked with ww to and from BR with SBA for safety as not sure how nerve block would affect him. BLE were stable walking with ww. When in BR he stood at commode and urinated into a urinal with SBA/supervision for balance. Pt then got back in bed and performed above exs. He then walked in the forrester to stairs and practiced the stairs safely. Pt is moving well and he and his wife's questions were answered. Pt is planning on going home today. Treatment:  Patient practiced and was instructed in the following treatment:    Bed mobility, transfers, sitting on EOB, standing with ww, gait with ww, and stairs to improve functional strength and endurance. Above exs to help improve L knee ROM and decrease spasm and pain. Gait training with ww and stair negotiation sequence with 2 rails and 1 rail and a SPC. Pt was left sitting up in chair with call light left by patient and wife was present. Pt's/ family goals   1. To go home. Patient and or family understand(s) diagnosis, prognosis, and plan of care.     PHYSICAL THERAPY PLAN OF CARE:    PT POC is established based on physician order and patient diagnosis     Referring provider/PT Order:  PT eval and treat  Diagnosis:  Osteoarthritis of left knee, unspecified osteoarthritis type [M17.12]  Primary osteoarthritis of left knee [M17.12]  Specific instructions for next treatment:  ROM exs and amb    Current Treatment Recommendations:     [x] Strengthening to improve independence with functional mobility   [x] ROM to improve ROM and decrease spasm and pain which will help promote independence with functional mobility   [x] Balance Training to improve static/dynamic balance and to reduce fall risk  [x] Endurance Training to improve activity tolerance during functional mobility   [x] Transfer Training to improve safety and independence with all functional transfers   [x] Gait Training to improve gait mechanics, endurance and assess need for appropriate assistive device  [x] Stair Training in preparation for safe discharge home and/or into the community   [] Positioning to prevent skin breakdown and contractures  [] Safety and Education Training   [x] Patient/Caregiver Education   [x] HEP  [] Other     PT long term treatment goals are located in above grid    Frequency of treatments: BID    Time in  13:00  Time out  14:00    Total Treatment Time  45 minutes     Evaluation Time includes thorough review of current medical information, gathering information on past medical history/social history and prior level of function, completion of standardized testing/informal observation of tasks, assessment of data and education on plan of care and goals. CPT codes:  [x] Low Complexity PT evaluation 48809  [] Moderate Complexity PT evaluation 14928  [] High Complexity PT evaluation 53955  [] PT Re-evaluation 78932  [x] Gait training 67209 15 minutes  [] Manual therapy 00906 ** minutes  [x] Therapeutic activities 48178 20 minutes  [x] Therapeutic exercises 84901 10 minutes  [] Neuromuscular reeducation 12322 ** minutes     Markus Robb., P.T.   License Number: [id number]

## 2023-01-13 NOTE — CARE COORDINATION
Met with patient and spouse about diagnosis and discharge plan of care. Pt post op left TKA. Pt seen in ortho class. Pt lives with spouse in 2 story home. 13 steps to bed and bath. 1/2 bath on 1st floor. Has all DME. Plan is home today with [facility]-notified and orders completed. Will follow-mjo    The Plan for Transition of Care is related to the following treatment goals: home with home care     The Patient and/or patient representative pt was provided with a choice of provider and agrees   with the discharge plan. [x] Yes [] No    Freedom of choice list was provided with basic dialogue that supports the patient's individualized plan of care/goals, treatment preferences and shares the quality data associated with the providers.  [x] Yes [] No

## 2023-01-13 NOTE — ANESTHESIA PROCEDURE NOTES
Spinal Block    Patient location during procedure: OR  End time: 1/13/2023 7:04 AM  Reason for block: primary anesthetic  Staffing  Performed: anesthesiologist   Anesthesiologist: Lydia Stallings MD  Resident/CRNA: DEENA Butler - ZARINA  Other anesthesia staff: Vandana Arenas RN  Spinal Block  Patient position: sitting  Prep: ChloraPrep  Patient monitoring: continuous pulse ox and frequent blood pressure checks  Approach: midline  Location: L4/L5  Provider prep: mask and sterile gloves  Local infiltration: lidocaine  Needle  Needle type: Quincke   Needle gauge: 25 G  Needle length: 3.5 in  Assessment  Swirl obtained: Yes  CSF: clear  Attempts: 1  Hemodynamics: stable  Preanesthetic Checklist  Completed: patient identified, IV checked, site marked, risks and benefits discussed, surgical/procedural consents, equipment checked, pre-op evaluation, timeout performed, anesthesia consent given, oxygen available and monitors applied/VS acknowledged

## 2023-02-09 ASSESSMENT — KOOS JR
RISING FROM SITTING: 1
BENDING TO THE FLOOR TO PICK UP OBJECT: 0
KOOS JR TOTAL INTERVAL SCORE: 65.994
STRAIGHTENING KNEE FULLY: 1
STANDING UPRIGHT: 1
GOING UP OR DOWN STAIRS: 2
TWISING OR PIVOTING ON KNEE: 1
HOW SEVERE IS YOUR KNEE STIFFNESS AFTER FIRST WAKING IN MORNING: 2

## 2023-02-09 ASSESSMENT — PROMIS GLOBAL HEALTH SCALE
IN GENERAL, WOULD YOU SAY YOUR QUALITY OF LIFE IS...[ON A SCALE OF 1 (POOR) TO 5 (EXCELLENT)]: 3
TO WHAT EXTENT ARE YOU ABLE TO CARRY OUT YOUR EVERYDAY PHYSICAL ACTIVITIES SUCH AS WALKING, CLIMBING STAIRS, CARRYING GROCERIES, OR MOVING A CHAIR [ON A SCALE OF 1 (NOT AT ALL) TO 5 (COMPLETELY)]?: 4
IN GENERAL, HOW WOULD YOU RATE YOUR SATISFACTION WITH YOUR SOCIAL ACTIVITIES AND RELATIONSHIPS [ON A SCALE OF 1 (POOR) TO 5 (EXCELLENT)]?: 4
IN GENERAL, HOW WOULD YOU RATE YOUR PHYSICAL HEALTH [ON A SCALE OF 1 (POOR) TO 5 (EXCELLENT)]?: 3
SUM OF RESPONSES TO QUESTIONS 2, 4, 5, & 10: 12
HOW IS THE PROMIS V1.1 BEING ADMINISTERED?: 2
SUM OF RESPONSES TO QUESTIONS 3, 6, 7, & 8: 14
IN GENERAL, WOULD YOU SAY YOUR HEALTH IS...[ON A SCALE OF 1 (POOR) TO 5 (EXCELLENT)]: 3
IN THE PAST 7 DAYS, HOW OFTEN HAVE YOU BEEN BOTHERED BY EMOTIONAL PROBLEMS, SUCH AS FEELING ANXIOUS, DEPRESSED, OR IRRITABLE [ON A SCALE FROM 1 (NEVER) TO 5 (ALWAYS)]?: 1
IN THE PAST 7 DAYS, HOW WOULD YOU RATE YOUR PAIN ON AVERAGE [ON A SCALE FROM 0 (NO PAIN) TO 10 (WORST IMAGINABLE PAIN)]?: 3
IN GENERAL, PLEASE RATE HOW WELL YOU CARRY OUT YOUR USUAL SOCIAL ACTIVITIES (INCLUDES ACTIVITIES AT HOME, AT WORK, AND IN YOUR COMMUNITY, AND RESPONSIBILITIES AS A PARENT, CHILD, SPOUSE, EMPLOYEE, FRIEND, ETC) [ON A SCALE OF 1 (POOR) TO 5 (EXCELLENT)]?: 3
IN THE PAST 7 DAYS, HOW WOULD YOU RATE YOUR FATIGUE ON AVERAGE [ON A SCALE FROM 1 (NONE) TO 5 (VERY SEVERE)]?: 4
IN GENERAL, HOW WOULD YOU RATE YOUR MENTAL HEALTH, INCLUDING YOUR MOOD AND YOUR ABILITY TO THINK [ON A SCALE OF 1 (POOR) TO 5 (EXCELLENT)]?: 4

## 2023-10-10 ASSESSMENT — KOOS JR: HOW SEVERE IS YOUR KNEE STIFFNESS AFTER FIRST WAKING IN MORNING: 1

## 2023-10-15 ASSESSMENT — PROMIS GLOBAL HEALTH SCALE
WHO IS THE PERSON COMPLETING THE PROMIS V1.1 SURVEY?: 0
IN THE PAST 7 DAYS, HOW WOULD YOU RATE YOUR FATIGUE ON AVERAGE [ON A SCALE FROM 1 (NONE) TO 5 (VERY SEVERE)]?: 4
SUM OF RESPONSES TO QUESTIONS 3, 6, 7, & 8: 14
HOW IS THE PROMIS V1.1 BEING ADMINISTERED?: 2
IN GENERAL, HOW WOULD YOU RATE YOUR SATISFACTION WITH YOUR SOCIAL ACTIVITIES AND RELATIONSHIPS [ON A SCALE OF 1 (POOR) TO 5 (EXCELLENT)]?: 4
IN GENERAL, PLEASE RATE HOW WELL YOU CARRY OUT YOUR USUAL SOCIAL ACTIVITIES (INCLUDES ACTIVITIES AT HOME, AT WORK, AND IN YOUR COMMUNITY, AND RESPONSIBILITIES AS A PARENT, CHILD, SPOUSE, EMPLOYEE, FRIEND, ETC) [ON A SCALE OF 1 (POOR) TO 5 (EXCELLENT)]?: 3
TO WHAT EXTENT ARE YOU ABLE TO CARRY OUT YOUR EVERYDAY PHYSICAL ACTIVITIES SUCH AS WALKING, CLIMBING STAIRS, CARRYING GROCERIES, OR MOVING A CHAIR [ON A SCALE OF 1 (NOT AT ALL) TO 5 (COMPLETELY)]?: 4
IN THE PAST 7 DAYS, HOW WOULD YOU RATE YOUR PAIN ON AVERAGE [ON A SCALE FROM 0 (NO PAIN) TO 10 (WORST IMAGINABLE PAIN)]?: 2
SUM OF RESPONSES TO QUESTIONS 2, 4, 5, & 10: 17
IN GENERAL, HOW WOULD YOU RATE YOUR MENTAL HEALTH, INCLUDING YOUR MOOD AND YOUR ABILITY TO THINK [ON A SCALE OF 1 (POOR) TO 5 (EXCELLENT)]?: 4
IN THE PAST 7 DAYS, HOW OFTEN HAVE YOU BEEN BOTHERED BY EMOTIONAL PROBLEMS, SUCH AS FEELING ANXIOUS, DEPRESSED, OR IRRITABLE [ON A SCALE FROM 1 (NEVER) TO 5 (ALWAYS)]?: 5
IN GENERAL, WOULD YOU SAY YOUR QUALITY OF LIFE IS...[ON A SCALE OF 1 (POOR) TO 5 (EXCELLENT)]: 4
IN GENERAL, HOW WOULD YOU RATE YOUR PHYSICAL HEALTH [ON A SCALE OF 1 (POOR) TO 5 (EXCELLENT)]?: 4
IN GENERAL, WOULD YOU SAY YOUR HEALTH IS...[ON A SCALE OF 1 (POOR) TO 5 (EXCELLENT)]: 4

## 2023-10-15 ASSESSMENT — KOOS JR
BENDING TO THE FLOOR TO PICK UP OBJECT: 0
RISING FROM SITTING: 0
TWISING OR PIVOTING ON KNEE: 1
GOING UP OR DOWN STAIRS: 1
STANDING UPRIGHT: 0
STRAIGHTENING KNEE FULLY: 0

## (undated) DEVICE — DOUBLE BASIN SET: Brand: MEDLINE INDUSTRIES, INC.

## (undated) DEVICE — TAPE ADH W3INXL10YD WHT COT WVN BK POWERFUL RUB BASE HIGHLY

## (undated) DEVICE — SOLUTION IV IRRIG POUR BRL 0.9% SODIUM CHL 2F7124

## (undated) DEVICE — SET IRR L100IN DIA0.280IN C100ML 2 NVENT PIERCING PINS 3

## (undated) DEVICE — DRAPE,REIN 53X77,STERILE: Brand: MEDLINE

## (undated) DEVICE — SUTURE STRATAFIX SYMMETRIC SZ 1 L18IN ABSRB VLT CT1 L36CM SXPP1A404

## (undated) DEVICE — BOWL AND CEMENT CARTRIDGE WITH BREAKAWAY FEMORAL NOZZLE: Brand: ACM

## (undated) DEVICE — GLOVE ORANGE PI 8 1/2   MSG9085

## (undated) DEVICE — COVER HNDL LT DISP

## (undated) DEVICE — GAUZE,SPONGE,4"X4",8PLY,STRL,LF,10/TRAY: Brand: MEDLINE

## (undated) DEVICE — TOWEL,OR,DSP,ST,BLUE,STD,6/PK,12PK/CS: Brand: MEDLINE

## (undated) DEVICE — STRIP,CLOSURE,WOUND,MEDI-STRIP,1/2X4: Brand: MEDLINE

## (undated) DEVICE — PIN BNE FIX TEMP L140MM DIA4MM MAKO

## (undated) DEVICE — PADDING CAST W6INXL4YD COT LO LINTING WYTEX

## (undated) DEVICE — TOTAL KNEE PK

## (undated) DEVICE — TUBE IRRIG HNDPC HI FLO TP INTRPULS W/SUCTION TUBE

## (undated) DEVICE — SYRINGE MED 50ML LUERLOCK TIP

## (undated) DEVICE — SOLUTION IRRIG 3000ML 0.9% SOD CHL USP UROMATIC PLAS CONT

## (undated) DEVICE — GLOVE ORTHO 8   MSG9480

## (undated) DEVICE — NEEDLE FLTR 18GA L1.5IN MEM THK5UM BLNT DISP

## (undated) DEVICE — PIN BNE FIX TEMP L110MM DIA4MM MAKO

## (undated) DEVICE — STRYKER PERFORMANCE SERIES SAGITTAL BLADE: Brand: STRYKER PERFORMANCE SERIES

## (undated) DEVICE — ZIMMER® STERILE DISPOSABLE TOURNIQUET CUFF WITH PLC, DUAL PORT, SINGLE BLADDER, 34 IN. (86 CM)

## (undated) DEVICE — Z INACTIVE USE 2855096 SPONGE GZ W4XL4IN 8 PLY 100% COT

## (undated) DEVICE — GLOVE ORANGE PI 7 1/2   MSG9075

## (undated) DEVICE — DBD-PACK,ARTHROSCOPY II: Brand: MEDLINE

## (undated) DEVICE — KIT DRP FOR RIO ROBOTIC ARM ASST SYS

## (undated) DEVICE — CHLORAPREP 26ML ORANGE

## (undated) DEVICE — 3M™ COBAN™ NL STERILE NON-LATEX SELF-ADHERENT WRAP, 2084S, 4 IN X 5 YD (10 CM X 4,5 M), 18 ROLLS/CASE: Brand: 3M™ COBAN™

## (undated) DEVICE — ZIMMER® STERILE DISPOSABLE TOURNIQUET CUFF WITH PLC, DUAL PORT, SINGLE BLADDER, 30 IN. (76 CM)

## (undated) DEVICE — PADDING UNDERCAST W6INXL4YD WYTEX 6 PER BG

## (undated) DEVICE — STANDARD HYPODERMIC NEEDLE,POLYPROPYLENE HUB: Brand: MONOJECT

## (undated) DEVICE — 3M™ IOBAN™ 2 ANTIMICROBIAL INCISE DRAPE 6650EZ: Brand: IOBAN™ 2

## (undated) DEVICE — COUNTER NDL 30 COUNT DBL MAG

## (undated) DEVICE — MARKER,SKIN,WI/RULER AND LABELS: Brand: MEDLINE

## (undated) DEVICE — KIT INT FIX FEM TIB CKPT MAKOPLASTY

## (undated) DEVICE — GLOVE SURG SZ 85 L12IN FNGR THK13MIL WHT ISOLEX POLYISOPRENE

## (undated) DEVICE — DRAPE,TOP,102X53,STERILE: Brand: MEDLINE

## (undated) DEVICE — BLADE SHV L13CM DIA4MM EXCALIBUR AGG COOLCUT

## (undated) DEVICE — SPONGE LAP W18XL18IN WHT COT 4 PLY FLD STRUNG RADPQ DISP ST

## (undated) DEVICE — 4-PORT MANIFOLD: Brand: NEPTUNE 2

## (undated) DEVICE — SYRINGE,CONTROL,LL,FINGER,GRIP: Brand: MEDLINE INDUSTRIES, INC.

## (undated) DEVICE — DRESSING PETRO W3XL8IN OIL EMUL N ADH GZ KNIT IMPREG CELOS

## (undated) DEVICE — SOLUTION IV IRRIG WATER 1000ML POUR BRL 2F7114

## (undated) DEVICE — DRESSING HYDROFIBER AQUACEL AG ADVANTAGE 3.5X14 IN

## (undated) DEVICE — GOWN,SIRUS,FABRNF,XL,20/CS: Brand: MEDLINE

## (undated) DEVICE — PAD,ABDOMINAL,5"X9",ST,LF,25/BX: Brand: MEDLINE INDUSTRIES, INC.

## (undated) DEVICE — SYRINGE 20ML LL S/C 50

## (undated) DEVICE — NEEDLE HYPO 22GA L1.5IN BLK POLYPR HUB S STL REG BVL STR

## (undated) DEVICE — TUBING PMP L16FT MAIN DISP FOR AR-6400 AR-6475

## (undated) DEVICE — INTENDED FOR TISSUE SEPARATION, AND OTHER PROCEDURES THAT REQUIRE A SHARP SURGICAL BLADE TO PUNCTURE OR CUT.: Brand: BARD-PARKER ® STAINLESS STEEL BLADES

## (undated) DEVICE — SYRINGE, LUER LOCK, 10ML: Brand: MEDLINE

## (undated) DEVICE — TUBING, SUCTION, 9/32" X 10', STRAIGHT: Brand: MEDLINE

## (undated) DEVICE — PACK PROCEDURE SURG GEN CUST

## (undated) DEVICE — KIT TRK KNEE PROC VIZADISC

## (undated) DEVICE — SOLUTION IV IRRIG LACTATED RINGERS 3000ML 2B7487

## (undated) DEVICE — 2108 SERIES SAGITTAL BLADE FAN, OFFSET  (34.5 X 0.8 X 64.0MM)

## (undated) DEVICE — GLOVE ORANGE PI 8   MSG9080

## (undated) DEVICE — NEEDLE,22GX1.5",REG,BEVEL: Brand: MEDLINE

## (undated) DEVICE — ELECTRODE PT RET AD L9FT HI MOIST COND ADH HYDRGEL CORDED

## (undated) DEVICE — APPLICATOR PREP 26ML 0.7% IOD POVACRYLEX 74% ISO ALC ST

## (undated) DEVICE — CONVERTORS STOCKINETTE: Brand: CONVERTORS

## (undated) DEVICE — GAUZE,SPONGE,4"X4",16PLY,XRAY,STRL,LF: Brand: MEDLINE

## (undated) DEVICE — BANDAGE COMPR W6INXL12FT SMOOTH FOR LIMB EXSANG ESMARCH

## (undated) DEVICE — COVER,LIGHT HANDLE,FLX,2/PK: Brand: MEDLINE INDUSTRIES, INC.

## (undated) DEVICE — 1000 S-DRAPE TOWEL DRAPE 10/BX: Brand: STERI-DRAPE™

## (undated) DEVICE — BNDG,ELSTC,MATRIX,STRL,6"X5YD,LF,HOOK&LP: Brand: MEDLINE

## (undated) DEVICE — DRESSING HYDROFIBER AQUACEL AG ADVANTAGE 3.5X6 IN